# Patient Record
Sex: FEMALE | Race: WHITE | NOT HISPANIC OR LATINO | Employment: FULL TIME | URBAN - METROPOLITAN AREA
[De-identification: names, ages, dates, MRNs, and addresses within clinical notes are randomized per-mention and may not be internally consistent; named-entity substitution may affect disease eponyms.]

---

## 2024-07-19 ENCOUNTER — CLINICAL SUPPORT (OUTPATIENT)
Dept: PRIMARY CARE | Facility: CLINIC | Age: 39
End: 2024-07-19
Payer: COMMERCIAL

## 2024-07-19 ENCOUNTER — APPOINTMENT (OUTPATIENT)
Dept: PRIMARY CARE | Facility: CLINIC | Age: 39
End: 2024-07-19

## 2024-07-19 ENCOUNTER — PHARMACY VISIT (OUTPATIENT)
Dept: PHARMACY | Facility: CLINIC | Age: 39
End: 2024-07-19
Payer: COMMERCIAL

## 2024-07-19 ENCOUNTER — OFFICE VISIT (OUTPATIENT)
Dept: PRIMARY CARE | Facility: CLINIC | Age: 39
End: 2024-07-19
Payer: COMMERCIAL

## 2024-07-19 VITALS
BODY MASS INDEX: 43.63 KG/M2 | WEIGHT: 278 LBS | OXYGEN SATURATION: 98 % | HEART RATE: 97 BPM | SYSTOLIC BLOOD PRESSURE: 122 MMHG | RESPIRATION RATE: 18 BRPM | TEMPERATURE: 96.6 F | HEIGHT: 67 IN | DIASTOLIC BLOOD PRESSURE: 72 MMHG

## 2024-07-19 DIAGNOSIS — R73.9 HYPERGLYCEMIA: Primary | ICD-10-CM

## 2024-07-19 DIAGNOSIS — E11.9 TYPE 2 DIABETES MELLITUS WITHOUT COMPLICATION, WITHOUT LONG-TERM CURRENT USE OF INSULIN (MULTI): Primary | ICD-10-CM

## 2024-07-19 DIAGNOSIS — R73.9 HYPERGLYCEMIA: ICD-10-CM

## 2024-07-19 DIAGNOSIS — Z00.00 ANNUAL PHYSICAL EXAM: ICD-10-CM

## 2024-07-19 PROBLEM — F33.9 DEPRESSION, MAJOR, RECURRENT (CMS-HCC): Status: ACTIVE | Noted: 2022-03-15

## 2024-07-19 PROBLEM — F41.9 ANXIETY: Status: ACTIVE | Noted: 2022-03-22

## 2024-07-19 PROBLEM — K64.8 INTERNAL AND EXTERNAL HEMORRHOIDS WITHOUT COMPLICATION: Status: ACTIVE | Noted: 2017-08-04

## 2024-07-19 PROBLEM — Z90.49 ACQUIRED ABSENCE OF OTHER SPECIFIED PARTS OF DIGESTIVE TRACT: Status: ACTIVE | Noted: 2019-01-03

## 2024-07-19 PROBLEM — F33.1 MDD (MAJOR DEPRESSIVE DISORDER), RECURRENT EPISODE, MODERATE (MULTI): Status: ACTIVE | Noted: 2017-11-13

## 2024-07-19 PROBLEM — F43.10 PTSD (POST-TRAUMATIC STRESS DISORDER): Status: ACTIVE | Noted: 2021-02-05

## 2024-07-19 PROBLEM — K64.4 INTERNAL AND EXTERNAL HEMORRHOIDS WITHOUT COMPLICATION: Status: ACTIVE | Noted: 2017-08-04

## 2024-07-19 PROBLEM — F41.1 GAD (GENERALIZED ANXIETY DISORDER): Status: ACTIVE | Noted: 2017-11-13

## 2024-07-19 PROBLEM — L30.9 DERMATITIS: Status: ACTIVE | Noted: 2018-06-14

## 2024-07-19 LAB — POC HEMOGLOBIN A1C: 6.6 % (ref 4.2–6.5)

## 2024-07-19 PROCEDURE — 3078F DIAST BP <80 MM HG: CPT | Performed by: FAMILY MEDICINE

## 2024-07-19 PROCEDURE — RXMED WILLOW AMBULATORY MEDICATION CHARGE

## 2024-07-19 PROCEDURE — 3074F SYST BP LT 130 MM HG: CPT | Performed by: FAMILY MEDICINE

## 2024-07-19 PROCEDURE — 99385 PREV VISIT NEW AGE 18-39: CPT | Performed by: FAMILY MEDICINE

## 2024-07-19 PROCEDURE — 3008F BODY MASS INDEX DOCD: CPT | Performed by: FAMILY MEDICINE

## 2024-07-19 PROCEDURE — 83036 HEMOGLOBIN GLYCOSYLATED A1C: CPT | Performed by: FAMILY MEDICINE

## 2024-07-19 RX ORDER — TIRZEPATIDE 5 MG/.5ML
5 INJECTION, SOLUTION SUBCUTANEOUS
Qty: 2 ML | Refills: 5 | Status: SHIPPED | OUTPATIENT
Start: 2024-07-19

## 2024-07-19 RX ORDER — TIRZEPATIDE 2.5 MG/.5ML
2.5 INJECTION, SOLUTION SUBCUTANEOUS
Qty: 2 ML | Refills: 0 | Status: SHIPPED | OUTPATIENT
Start: 2024-07-19

## 2024-07-19 ASSESSMENT — PATIENT HEALTH QUESTIONNAIRE - PHQ9
1. LITTLE INTEREST OR PLEASURE IN DOING THINGS: NOT AT ALL
2. FEELING DOWN, DEPRESSED OR HOPELESS: NOT AT ALL
SUM OF ALL RESPONSES TO PHQ9 QUESTIONS 1 AND 2: 0

## 2024-07-19 ASSESSMENT — ENCOUNTER SYMPTOMS
OCCASIONAL FEELINGS OF UNSTEADINESS: 0
LOSS OF SENSATION IN FEET: 0
DEPRESSION: 0

## 2024-07-19 ASSESSMENT — PAIN SCALES - GENERAL: PAINLEVEL: 0-NO PAIN

## 2024-07-19 NOTE — PROGRESS NOTES
"[facility] (VBID)    Hansa Mix is a 39 y.o. female was referred to Clinical Pharmacy Team to complete a comprehensive medication review (CMR) with a pharmacist as part of the [employer] diabetes program.  Pharmacy team may also provide assistance in diabetes management per discussion with referring provider and/or endocrinology.    Referring Provider: Perry Tian, DO  Does patient follow with Endocrinology: No  Endocrinology Provider Name: n/a    Subjective   No Known Allergies    [facility]  [address], [place] [address]  [facility] [address]  Phone: [phone] Fax: [phone]      HPI    New diagnosis of diabetes. Family history present with father.     Objective     There were no vitals taken for this visit.     LAB  No results found for: \"BILITOT\", \"CALCIUM\", \"CO2\", \"CL\", \"CREATININE\", \"GLUCOSE\", \"ALKPHOS\", \"K\", \"PROT\", \"NA\", \"AST\", \"ALT\", \"BUN\", \"ANIONGAP\", \"MG\", \"PHOS\", \"GGT\", \"LDH\", \"ALBUMIN\", \"AMYLASE\", \"LIPASE\", \"GFRF\", \"GFRMALE\"  No results found for: \"TRIG\", \"CHOL\", \"LDLCALC\", \"HDL\"  Lab Results   Component Value Date    HGBA1C 6.6 (A) 2024       No current outpatient medications on file prior to visit.     No current facility-administered medications on file prior to visit.        HISTORICAL PHARMACOTHERAPY (if relevant)  -new diagnosis    CURRENT PHARMACOTHERAPY (if differing from list above)  -n/a    DRUG INTERACTIONS  - n/a    Secondary Prevention:  Statin? No  ACE-I/ARB? No  Aspirin? No    Pertinent PMH Review:  PMH of Pancreatitis: No  PMH of Retinopathy: No  PMH of Urinary Tract Infections: No  PMH of MTC: No    Glucose Readings:  Glucometer/CGM Type: n/a  Patient does not test glucose, new DM diagnosis  Reviewed testing with glucometer  SMBG MEASUREMENTS  Targets reviewed  - Fasting B - 130 mg/dL  - Postprandial (1-2 hours) BG: less than 180 mg/dL  - A1c: less than 7%    HYPOGLYCEMIA  Reviewed " symptoms, treatment and prevention  Advised carrying glucose at all all times     LIFESTYLE  Diet:  CHO foods, portions and meal planning reviewed  Advised protein with meals and starting with protein for mixed macronutrient meals  Reinforced carbs limits    Exercise:  Benefits reviewed, advised working up to 30 minutes    ASSESSMENT/PLAN:   Employee Health Diabetes Program (VBID)  - Patient enrolled in  Employee diabetes program for $0 co-pays on diabetes medications/supplies. Enrollment should be active in 2-4 weeks.  - Requested VBID enrollment date: 07/19/24  - PharmD Management Level:  4  -  Pharmacy fill location: [facility]    New diabetes diagnosis as evidenced by A1c of 6.6%  Start GLP-1/GIP agonist to reduce glucose and for additional weight loss benefit    Patient received the following medication education on Mounjaro:    - Counseled patient on MOA, expectations, side effects, duration of therapy, contraindications, administration, and monitoring parameters.  - Provided detailed dosing and administration counseling to ensure proper technique.   - Reviewed Mounjaro titration schedule, starting with 2.5 mg once weekly to 5mg starting on the 5th week. Patient verbalized understanding  - Counseled patient on the benefits of GLP-1ra glycemic control and weight loss  - Reviewed storage requirements of Mounjaro when not in use, and when to administer the medication if a dose is missed.  - Advised patient that they may experience improved satiety after meals and portion sizes of meals may be reduced as doses of Mounjaro increase.    Reviewed drug interaction with oral contraceptives. Discussed need for back up method such as barrier/condoms to prevent pregnancy during first four weeks of each dose titration. Aware that this medication class is not recommended in pregnancy and to be stopped immediately if pregnancy occurs.       Medication education provided by LOPEZ Ruano, PharmD, BCPS    Assessment/Plan    Problem List Items Addressed This Visit       Type 2 diabetes mellitus without complication, without long-term current use of insulin (Multi) - Primary     Start Mounjaro 2.5mg once weekly x4 weeks  Increase to Mounjaro 5mg once weekly thereafter           Relevant Medications    tirzepatide (Mounjaro) 2.5 mg/0.5 mL pen injector    tirzepatide (Mounjaro) 5 mg/0.5 mL pen injector     Other Visit Diagnoses       Hyperglycemia                   Follow up: 3 months    Continue all meds under the continuation of care with the referring provider and clinical pharmacy team.    Citlali Ruano, [facility]     Verbal consent to manage patient's drug therapy was obtained from [the patient and/or an individual authorized to act on behalf of a patient]. They were informed they may decline to participate or withdraw from participation in pharmacy services at any time.

## 2024-07-19 NOTE — PROGRESS NOTES
"Subjective   Patient ID: Hansa Mix is a 39 y.o. female who presents for Annual Exam and Obesity.    HPI wants to lose weight.  No other complaints    Review of Systems    Objective   /72   Pulse 97   Temp 35.9 °C (96.6 °F)   Resp 18   Ht 1.702 m (5' 7\")   Wt 126 kg (278 lb)   SpO2 98%   BMI 43.54 kg/m²     Physical Exam  Vitals and nursing note reviewed.   Constitutional:       General: She is not in acute distress.  HENT:      Right Ear: Tympanic membrane and ear canal normal.      Left Ear: Tympanic membrane and ear canal normal.      Nose: Nose normal. No rhinorrhea.      Mouth/Throat:      Pharynx: Oropharynx is clear. No oropharyngeal exudate or posterior oropharyngeal erythema.      Comments: Dentition wnl  Eyes:      Extraocular Movements: Extraocular movements intact.      Conjunctiva/sclera: Conjunctivae normal.      Pupils: Pupils are equal, round, and reactive to light.   Neck:      Vascular: No carotid bruit.   Cardiovascular:      Rate and Rhythm: Normal rate and regular rhythm.      Heart sounds: Normal heart sounds. No murmur heard.  Pulmonary:      Breath sounds: Normal breath sounds. No wheezing or rhonchi.   Abdominal:      General: Bowel sounds are normal. There is no distension.      Palpations: Abdomen is soft. There is no mass.      Tenderness: There is no abdominal tenderness. There is no guarding or rebound.      Hernia: No hernia is present.   Musculoskeletal:         General: No swelling or tenderness. Normal range of motion.      Cervical back: Normal range of motion and neck supple.   Lymphadenopathy:      Cervical: No cervical adenopathy.   Skin:     General: Skin is warm.      Findings: No rash.   Neurological:      General: No focal deficit present.      Mental Status: She is alert.         Assessment/Plan   Problem List Items Addressed This Visit    None  Visit Diagnoses         Codes    Hyperglycemia    -  Primary R73.9    Relevant Orders    Follow Up In Clinical " Pharmacy    POCT glycosylated hemoglobin (Hb A1C) manually resulted (Completed)    Annual physical exam     Z00.00

## 2024-07-19 NOTE — PROGRESS NOTES
"Subjective   Patient ID: Hansa Mix is a 39 y.o. female who presents for Annual Exam and Obesity.    HPI she is not fasting     Review of Systems    Objective   /72   Pulse 97   Temp 35.9 °C (96.6 °F)   Resp 18   Ht 1.702 m (5' 7\")   Wt 126 kg (278 lb)   SpO2 98%   BMI 43.54 kg/m²     Physical Exam    Assessment/Plan          "

## 2024-07-22 ENCOUNTER — PATIENT MESSAGE (OUTPATIENT)
Dept: PRIMARY CARE | Facility: CLINIC | Age: 39
End: 2024-07-22
Payer: COMMERCIAL

## 2024-07-22 ENCOUNTER — LAB (OUTPATIENT)
Dept: LAB | Facility: LAB | Age: 39
End: 2024-07-22
Payer: COMMERCIAL

## 2024-07-22 DIAGNOSIS — R73.9 HYPERGLYCEMIA: ICD-10-CM

## 2024-07-22 DIAGNOSIS — Z00.00 ANNUAL PHYSICAL EXAM: ICD-10-CM

## 2024-07-22 LAB
BASOPHILS # BLD AUTO: 0.12 X10*3/UL (ref 0–0.1)
BASOPHILS NFR BLD AUTO: 1.2 %
EOSINOPHIL # BLD AUTO: 0.29 X10*3/UL (ref 0–0.7)
EOSINOPHIL NFR BLD AUTO: 2.9 %
ERYTHROCYTE [DISTWIDTH] IN BLOOD BY AUTOMATED COUNT: 13 % (ref 11.5–14.5)
HCT VFR BLD AUTO: 43 % (ref 36–46)
HGB BLD-MCNC: 13.9 G/DL (ref 12–16)
IMM GRANULOCYTES # BLD AUTO: 0.03 X10*3/UL (ref 0–0.7)
IMM GRANULOCYTES NFR BLD AUTO: 0.3 % (ref 0–0.9)
LYMPHOCYTES # BLD AUTO: 3.95 X10*3/UL (ref 1.2–4.8)
LYMPHOCYTES NFR BLD AUTO: 40 %
MCH RBC QN AUTO: 29.6 PG (ref 26–34)
MCHC RBC AUTO-ENTMCNC: 32.3 G/DL (ref 32–36)
MCV RBC AUTO: 92 FL (ref 80–100)
MONOCYTES # BLD AUTO: 0.59 X10*3/UL (ref 0.1–1)
MONOCYTES NFR BLD AUTO: 6 %
NEUTROPHILS # BLD AUTO: 4.9 X10*3/UL (ref 1.2–7.7)
NEUTROPHILS NFR BLD AUTO: 49.6 %
NRBC BLD-RTO: 0 /100 WBCS (ref 0–0)
PLATELET # BLD AUTO: 364 X10*3/UL (ref 150–450)
RBC # BLD AUTO: 4.7 X10*6/UL (ref 4–5.2)
WBC # BLD AUTO: 9.9 X10*3/UL (ref 4.4–11.3)

## 2024-07-22 PROCEDURE — 80053 COMPREHEN METABOLIC PANEL: CPT

## 2024-07-22 PROCEDURE — 84443 ASSAY THYROID STIM HORMONE: CPT

## 2024-07-22 PROCEDURE — 36415 COLL VENOUS BLD VENIPUNCTURE: CPT

## 2024-07-22 PROCEDURE — 80061 LIPID PANEL: CPT

## 2024-07-22 PROCEDURE — 85025 COMPLETE CBC W/AUTO DIFF WBC: CPT

## 2024-07-23 LAB
ALBUMIN SERPL BCP-MCNC: 4.1 G/DL (ref 3.4–5)
ALP SERPL-CCNC: 63 U/L (ref 33–110)
ALT SERPL W P-5'-P-CCNC: 29 U/L (ref 7–45)
ANION GAP SERPL CALC-SCNC: 11 MMOL/L (ref 10–20)
AST SERPL W P-5'-P-CCNC: 25 U/L (ref 9–39)
BILIRUB SERPL-MCNC: 0.5 MG/DL (ref 0–1.2)
BUN SERPL-MCNC: 9 MG/DL (ref 6–23)
CALCIUM SERPL-MCNC: 9.2 MG/DL (ref 8.6–10.3)
CHLORIDE SERPL-SCNC: 104 MMOL/L (ref 98–107)
CHOLEST SERPL-MCNC: 194 MG/DL (ref 0–199)
CHOLESTEROL/HDL RATIO: 6.2
CO2 SERPL-SCNC: 26 MMOL/L (ref 21–32)
CREAT SERPL-MCNC: 0.75 MG/DL (ref 0.5–1.05)
EGFRCR SERPLBLD CKD-EPI 2021: >90 ML/MIN/1.73M*2
GLUCOSE SERPL-MCNC: 94 MG/DL (ref 74–99)
HDLC SERPL-MCNC: 31.5 MG/DL
LDLC SERPL CALC-MCNC: 134 MG/DL
NON HDL CHOLESTEROL: 163 MG/DL (ref 0–149)
POTASSIUM SERPL-SCNC: 4.2 MMOL/L (ref 3.5–5.3)
PROT SERPL-MCNC: 7.3 G/DL (ref 6.4–8.2)
SODIUM SERPL-SCNC: 137 MMOL/L (ref 136–145)
TRIGL SERPL-MCNC: 145 MG/DL (ref 0–149)
TSH SERPL-ACNC: 3.02 MIU/L (ref 0.44–3.98)
VLDL: 29 MG/DL (ref 0–40)

## 2024-08-07 PROCEDURE — RXMED WILLOW AMBULATORY MEDICATION CHARGE

## 2024-08-13 ENCOUNTER — PHARMACY VISIT (OUTPATIENT)
Dept: PHARMACY | Facility: CLINIC | Age: 39
End: 2024-08-13
Payer: COMMERCIAL

## 2024-08-22 ASSESSMENT — ENCOUNTER SYMPTOMS
NERVOUS/ANXIOUS: 0
POLYDIPSIA: 0
BLACKOUTS: 0
WEIGHT LOSS: 0
TREMORS: 0
WEAKNESS: 0
FATIGUE: 0
DIZZINESS: 0
SPEECH DIFFICULTY: 0
VISUAL CHANGE: 0
SWEATS: 0
HUNGER: 0
BLURRED VISION: 0
POLYPHAGIA: 0
CONFUSION: 0
HEADACHES: 0
SEIZURES: 0

## 2024-08-26 ENCOUNTER — OFFICE VISIT (OUTPATIENT)
Dept: PRIMARY CARE | Facility: CLINIC | Age: 39
End: 2024-08-26
Payer: COMMERCIAL

## 2024-08-26 VITALS
HEART RATE: 98 BPM | OXYGEN SATURATION: 96 % | RESPIRATION RATE: 18 BRPM | SYSTOLIC BLOOD PRESSURE: 122 MMHG | WEIGHT: 266.8 LBS | TEMPERATURE: 96 F | DIASTOLIC BLOOD PRESSURE: 62 MMHG | HEIGHT: 67 IN | BODY MASS INDEX: 41.88 KG/M2

## 2024-08-26 DIAGNOSIS — J06.9 VIRAL UPPER RESPIRATORY TRACT INFECTION: ICD-10-CM

## 2024-08-26 DIAGNOSIS — E11.9 TYPE 2 DIABETES MELLITUS WITHOUT COMPLICATION, WITHOUT LONG-TERM CURRENT USE OF INSULIN (MULTI): Primary | ICD-10-CM

## 2024-08-26 PROCEDURE — 3008F BODY MASS INDEX DOCD: CPT | Performed by: FAMILY MEDICINE

## 2024-08-26 PROCEDURE — 3078F DIAST BP <80 MM HG: CPT | Performed by: FAMILY MEDICINE

## 2024-08-26 PROCEDURE — 99213 OFFICE O/P EST LOW 20 MIN: CPT | Performed by: FAMILY MEDICINE

## 2024-08-26 PROCEDURE — 3074F SYST BP LT 130 MM HG: CPT | Performed by: FAMILY MEDICINE

## 2024-08-26 PROCEDURE — 3050F LDL-C >= 130 MG/DL: CPT | Performed by: FAMILY MEDICINE

## 2024-08-26 ASSESSMENT — ENCOUNTER SYMPTOMS
WEAKNESS: 0
HEADACHES: 0
TREMORS: 0
POLYPHAGIA: 0
POLYDIPSIA: 0
SPEECH DIFFICULTY: 0
DIZZINESS: 0
DEPRESSION: 0
FATIGUE: 0
BLURRED VISION: 0
NERVOUS/ANXIOUS: 0
WEIGHT LOSS: 0
CONFUSION: 0
OCCASIONAL FEELINGS OF UNSTEADINESS: 0
SEIZURES: 0
VISUAL CHANGE: 0
BLACKOUTS: 0
SWEATS: 0
HUNGER: 0
LOSS OF SENSATION IN FEET: 0

## 2024-08-26 ASSESSMENT — PAIN SCALES - GENERAL: PAINLEVEL: 0-NO PAIN

## 2024-08-26 NOTE — PROGRESS NOTES
Answers submitted by the patient for this visit:  Diabetes Questionnaire (Submitted on 8/22/2024)  Chief Complaint: Diabetes problem  Diabetes type: type 2  MedicAlert ID: No  Disease duration: 1 Months  blurred vision: No  chest pain: No  fatigue: No  foot paresthesias: No  foot ulcerations: No  polydipsia: No  polyphagia: No  polyuria: No  visual change: No  weakness: No  weight loss: No  confusion: No  speech difficulty: No  dizziness: No  nervous/anxious: No  headaches: No  hunger: No  mood changes: No  pallor: No  seizures: No  tremors: No  sleepiness: No  sweats: No  blackouts: No  hospitalization: No  nocturnal hypoglycemia: No  required assistance: No  required glucagon: No  CVA: No  heart disease: No  impotence: No  nephropathy: No  peripheral neuropathy: No  PVD: No  retinopathy: No  CAD risks: dyslipidemia, family history, obesity, sedentary lifestyle, stress, tobacco exposure  Current treatments: oral agent (monotherapy)  Treatment compliance: all of the time  Dietitian visit: No  Eye exam current: No  Sees podiatrist: No

## 2024-08-26 NOTE — PROGRESS NOTES
"Subjective   Patient ID: Hansa Mix is a 39 y.o. female who presents for Med Refill and Sinus Problem.    Diabetes  She has type 2 diabetes mellitus. No MedicAlert identification noted. The initial diagnosis of diabetes was made 1 months ago. Pertinent negatives for hypoglycemia include no confusion, dizziness, headaches, hunger, mood changes, nervousness/anxiousness, pallor, seizures, sleepiness, speech difficulty, sweats or tremors. Pertinent negatives for diabetes include no blurred vision, no chest pain, no fatigue, no foot paresthesias, no foot ulcerations, no polydipsia, no polyphagia, no polyuria, no visual change, no weakness and no weight loss. Pertinent negatives for hypoglycemia complications include no blackouts, no hospitalization, no nocturnal hypoglycemia, no required assistance and no required glucagon injection. Pertinent negatives for diabetic complications include no CVA, heart disease, impotence, nephropathy, peripheral neuropathy, PVD or retinopathy. Risk factors for coronary artery disease include dyslipidemia, family history, obesity, sedentary lifestyle, stress and tobacco exposure. Current diabetic treatment includes oral agent (monotherapy). She is compliant with treatment all of the time. She has not had a previous visit with a dietitian. She does not see a podiatrist.Eye exam is not current.        Review of Systems   Constitutional:  Negative for fatigue and weight loss.   Eyes:  Negative for blurred vision.   Cardiovascular:  Negative for chest pain.   Endocrine: Negative for polydipsia, polyphagia and polyuria.   Genitourinary:  Negative for impotence.   Skin:  Negative for pallor.   Neurological:  Negative for dizziness, tremors, seizures, speech difficulty, weakness and headaches.   Psychiatric/Behavioral:  Negative for confusion. The patient is not nervous/anxious.        Objective   /62   Pulse 98   Temp 35.6 °C (96 °F)   Resp 18   Ht 1.702 m (5' 7\")   Wt 121 kg " (266 lb 12.8 oz)   SpO2 96%   BMI 41.79 kg/m²     Physical Exam  Constitutional:       General: She is not in acute distress.     Appearance: Normal appearance.   Cardiovascular:      Rate and Rhythm: Normal rate and regular rhythm.      Heart sounds: No murmur heard.  Pulmonary:      Breath sounds: Normal breath sounds. No wheezing.   Neurological:      Mental Status: She is alert.         Assessment/Plan   Problem List Items Addressed This Visit             ICD-10-CM    Type 2 diabetes mellitus without complication, without long-term current use of insulin (Multi) - Primary E11.9     Other Visit Diagnoses         Codes    Viral upper respiratory tract infection     J06.9          Supportive tx

## 2024-09-04 PROCEDURE — RXMED WILLOW AMBULATORY MEDICATION CHARGE

## 2024-09-09 ENCOUNTER — PHARMACY VISIT (OUTPATIENT)
Dept: PHARMACY | Facility: CLINIC | Age: 39
End: 2024-09-09
Payer: COMMERCIAL

## 2024-09-17 DIAGNOSIS — E11.9 TYPE 2 DIABETES MELLITUS WITHOUT COMPLICATION, WITHOUT LONG-TERM CURRENT USE OF INSULIN (MULTI): Primary | ICD-10-CM

## 2024-09-17 RX ORDER — TIRZEPATIDE 7.5 MG/.5ML
7.5 INJECTION, SOLUTION SUBCUTANEOUS
Qty: 2 ML | Refills: 3 | Status: SHIPPED | OUTPATIENT
Start: 2024-09-17

## 2024-09-23 PROCEDURE — RXMED WILLOW AMBULATORY MEDICATION CHARGE

## 2024-09-24 ENCOUNTER — PHARMACY VISIT (OUTPATIENT)
Dept: PHARMACY | Facility: CLINIC | Age: 39
End: 2024-09-24
Payer: COMMERCIAL

## 2024-10-07 ENCOUNTER — PHARMACY VISIT (OUTPATIENT)
Dept: PHARMACY | Facility: CLINIC | Age: 39
End: 2024-10-07
Payer: COMMERCIAL

## 2024-10-07 DIAGNOSIS — R11.0 NAUSEA: Primary | ICD-10-CM

## 2024-10-07 PROCEDURE — RXMED WILLOW AMBULATORY MEDICATION CHARGE

## 2024-10-07 RX ORDER — ONDANSETRON 4 MG/1
4 TABLET, FILM COATED ORAL EVERY 8 HOURS PRN
Qty: 20 TABLET | Refills: 1 | Status: SHIPPED | OUTPATIENT
Start: 2024-10-07

## 2024-10-17 DIAGNOSIS — E11.9 TYPE 2 DIABETES MELLITUS WITHOUT COMPLICATION, WITHOUT LONG-TERM CURRENT USE OF INSULIN (MULTI): Primary | ICD-10-CM

## 2024-10-17 PROCEDURE — RXMED WILLOW AMBULATORY MEDICATION CHARGE

## 2024-10-17 RX ORDER — TIRZEPATIDE 10 MG/.5ML
10 INJECTION, SOLUTION SUBCUTANEOUS
Qty: 2 ML | Refills: 3 | Status: SHIPPED | OUTPATIENT
Start: 2024-10-20

## 2024-10-21 ENCOUNTER — PHARMACY VISIT (OUTPATIENT)
Dept: PHARMACY | Facility: CLINIC | Age: 39
End: 2024-10-21
Payer: COMMERCIAL

## 2024-11-11 ENCOUNTER — PHARMACY VISIT (OUTPATIENT)
Dept: PHARMACY | Facility: CLINIC | Age: 39
End: 2024-11-11
Payer: COMMERCIAL

## 2024-11-11 DIAGNOSIS — E11.9 TYPE 2 DIABETES MELLITUS WITHOUT COMPLICATION, WITHOUT LONG-TERM CURRENT USE OF INSULIN (MULTI): Primary | ICD-10-CM

## 2024-11-11 PROCEDURE — RXMED WILLOW AMBULATORY MEDICATION CHARGE

## 2024-11-11 RX ORDER — TIRZEPATIDE 12.5 MG/.5ML
12.5 INJECTION, SOLUTION SUBCUTANEOUS WEEKLY
Qty: 2 ML | Refills: 3 | Status: SHIPPED | OUTPATIENT
Start: 2024-11-11

## 2024-12-02 ENCOUNTER — OFFICE VISIT (OUTPATIENT)
Dept: PRIMARY CARE | Facility: CLINIC | Age: 39
End: 2024-12-02
Payer: COMMERCIAL

## 2024-12-02 ENCOUNTER — PHARMACY VISIT (OUTPATIENT)
Dept: PHARMACY | Facility: CLINIC | Age: 39
End: 2024-12-02
Payer: COMMERCIAL

## 2024-12-02 VITALS
HEIGHT: 67 IN | RESPIRATION RATE: 18 BRPM | SYSTOLIC BLOOD PRESSURE: 128 MMHG | BODY MASS INDEX: 37.54 KG/M2 | HEART RATE: 93 BPM | WEIGHT: 239.2 LBS | OXYGEN SATURATION: 98 % | DIASTOLIC BLOOD PRESSURE: 86 MMHG | TEMPERATURE: 97.3 F

## 2024-12-02 DIAGNOSIS — K64.4 EXTERNAL HEMORRHOID: ICD-10-CM

## 2024-12-02 DIAGNOSIS — R03.0 ELEVATED BLOOD PRESSURE READING: ICD-10-CM

## 2024-12-02 DIAGNOSIS — K62.89 RECTAL PAIN: Primary | ICD-10-CM

## 2024-12-02 PROCEDURE — RXMED WILLOW AMBULATORY MEDICATION CHARGE

## 2024-12-02 PROCEDURE — 3050F LDL-C >= 130 MG/DL: CPT | Performed by: NURSE PRACTITIONER

## 2024-12-02 PROCEDURE — 3008F BODY MASS INDEX DOCD: CPT | Performed by: NURSE PRACTITIONER

## 2024-12-02 PROCEDURE — 3074F SYST BP LT 130 MM HG: CPT | Performed by: NURSE PRACTITIONER

## 2024-12-02 PROCEDURE — 99213 OFFICE O/P EST LOW 20 MIN: CPT | Performed by: NURSE PRACTITIONER

## 2024-12-02 PROCEDURE — 3079F DIAST BP 80-89 MM HG: CPT | Performed by: NURSE PRACTITIONER

## 2024-12-02 RX ORDER — HYDROCORTISONE ACETATE, PRAMOXINE HCL 2.5; 1 G/100G; G/100G
CREAM TOPICAL 3 TIMES DAILY
Qty: 57 G | Refills: 1 | Status: SHIPPED | OUTPATIENT
Start: 2024-12-02 | End: 2025-12-02

## 2024-12-02 ASSESSMENT — ENCOUNTER SYMPTOMS: RECTAL PAIN: 1

## 2024-12-02 ASSESSMENT — PAIN SCALES - GENERAL: PAINLEVEL_OUTOF10: 9

## 2024-12-02 NOTE — PROGRESS NOTES
"Subjective   Patient ID: Hansa Mxi is a 39 y.o. female who presents for Rectal Pain (PT c/o RECTAL PAIN X 1 MONTH. PT HAS TRIED MULTIPLE OTC HEMORRHOID MEDICATIONS. PT REPORTS SOME BLEEDING. PAIN IS WORSE WITH SITTING. /PT HAD AN ANAL FISSURE 10 YEARS AGO, STATES SYMPTOMS ARE SIMILAR. ).    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        Occasional  blood  RECTAL PAIN       Objective   /86   Pulse 93   Temp 36.3 °C (97.3 °F)   Resp 18   Ht 1.702 m (5' 7\")   Wt 109 kg (239 lb 3.2 oz)   LMP 11/29/2024 (Exact Date)   SpO2 98%   BMI 37.46 kg/m²     Physical Exam  Constitutional:       Appearance: Normal appearance.   Cardiovascular:      Rate and Rhythm: Normal rate and regular rhythm.   Abdominal:      Comments: Rectal exam  hemmroids very painful when checking for blood  ? Possible fissure   Neurological:      Mental Status: She is alert and oriented to person, place, and time.   Psychiatric:         Behavior: Behavior normal.         Assessment/Plan   Problem List Items Addressed This Visit    None  Visit Diagnoses         Codes    Rectal pain    -  Primary K62.89    Relevant Medications    hydrocortisone-pramoxine cream    Other Relevant Orders    Referral to General Surgery    Referral to General Surgery    Acute hemorrhoid     K64.9        Discussed referral hemorrhoid unless  internal not thrombosed or appear to be a\flaring up . Continue sitz bath   call if unable to get into surgeon       "

## 2024-12-03 ENCOUNTER — PHARMACY VISIT (OUTPATIENT)
Dept: PHARMACY | Facility: CLINIC | Age: 39
End: 2024-12-03
Payer: COMMERCIAL

## 2024-12-04 ENCOUNTER — OFFICE VISIT (OUTPATIENT)
Facility: CLINIC | Age: 39
End: 2024-12-04
Payer: COMMERCIAL

## 2024-12-04 ENCOUNTER — PHARMACY VISIT (OUTPATIENT)
Dept: PHARMACY | Facility: CLINIC | Age: 39
End: 2024-12-04
Payer: COMMERCIAL

## 2024-12-04 VITALS
DIASTOLIC BLOOD PRESSURE: 78 MMHG | HEIGHT: 67 IN | BODY MASS INDEX: 37.51 KG/M2 | OXYGEN SATURATION: 97 % | HEART RATE: 108 BPM | SYSTOLIC BLOOD PRESSURE: 114 MMHG | WEIGHT: 239 LBS | RESPIRATION RATE: 15 BRPM

## 2024-12-04 DIAGNOSIS — K60.2 ANAL FISSURE, UNSPECIFIED: ICD-10-CM

## 2024-12-04 DIAGNOSIS — K62.89 RECTAL PAIN: ICD-10-CM

## 2024-12-04 PROCEDURE — 99203 OFFICE O/P NEW LOW 30 MIN: CPT | Performed by: SURGERY

## 2024-12-04 PROCEDURE — RXMED WILLOW AMBULATORY MEDICATION CHARGE

## 2024-12-04 PROCEDURE — 3078F DIAST BP <80 MM HG: CPT | Performed by: SURGERY

## 2024-12-04 PROCEDURE — 3050F LDL-C >= 130 MG/DL: CPT | Performed by: SURGERY

## 2024-12-04 PROCEDURE — 3074F SYST BP LT 130 MM HG: CPT | Performed by: SURGERY

## 2024-12-04 PROCEDURE — 3008F BODY MASS INDEX DOCD: CPT | Performed by: SURGERY

## 2024-12-04 RX ORDER — OXYCODONE AND ACETAMINOPHEN 5; 325 MG/1; MG/1
1 TABLET ORAL EVERY 6 HOURS PRN
Qty: 5 TABLET | Refills: 0 | Status: SHIPPED | OUTPATIENT
Start: 2024-12-04 | End: 2024-12-11

## 2024-12-04 ASSESSMENT — PAIN SCALES - GENERAL: PAINLEVEL_OUTOF10: 9

## 2024-12-04 NOTE — PROGRESS NOTES
Subjective   Patient ID: Hansa Mix is a 39 y.o. female who presents for Rectal Pain and New Patient Visit (Patient has had rectal pain for last month.Patient states it has gotten worse over last 4 days. Patient states has had bleeding on and off for a month.).  HPI  This is a pleasant patient who is in the office today because of most likely a Ament anal fissure.  The patient states that 7 years ago she had a similar type episode a lot of pain some bleeding used a lot of creams and sitz bath's and stool softeners and when it still did not heal she saw colorectal surgeon at that time at the [facility] and had a lateral internal sphincterotomy performed.  The patient had been well since but then about a month ago she started having the same issues all over again the same pain a lot of bleeding and just very very uncomfortable.  The patient is quite tearful in the office.  Review of Systems 10 point review is otherwise negative    Social history she does smoke she does not drink alcohol.    Objective   Physical Exam on physical exam of that region the patient does have some external hemorrhoidal tissue there is an external hemorrhoid that looks as though it might have been thrombosed at 1 time because it seems to be decompressed now there is no bleeding.  I was not able to do a digital exam of course posteriorly she has some rale looking tissue that would be suggestive of a fissure once again.  It is very wide.  There is no inflammatory exudate on it.  The surrounding perianal skin is soft does not seem to be any abscess or other concerns.    Assessment/Plan posterior anal fissure.  I recommended that the patient continue with the cream since it is best that she has been doing I have nothing else that I can offer because she has already had a lateral internal sphincterotomy to my knowledge that cannot necessarily be repeated although I do not know the definitive answer to that question.  I would recommend  that she see colorectal surgery for further management.  There is the possibility that an exam under anesthesia might be more revealing or possibly there is another issue however the history seems very consistent with a fissure and I think that her best bet is to see colorectal.           Lisa Wright MD 12/04/24 2:29 PM

## 2024-12-04 NOTE — LETTER
December 4, 2024     Patient: Hansa Mix   YOB: 1985   Date of Visit: 12/4/2024       To Whom It May Concern:    Hansa Mix was seen in my clinic on 12/4/2024 at 1:00 pm. Please excuse Hansa for her absence from work from 12/4/2024 until 12/16/2024.   If you have any questions or concerns, please don't hesitate to call.         Sincerely,         Lisa Wright MD        CC: No Recipients

## 2024-12-05 ENCOUNTER — APPOINTMENT (OUTPATIENT)
Dept: SURGERY | Facility: CLINIC | Age: 39
End: 2024-12-05
Payer: COMMERCIAL

## 2024-12-06 ENCOUNTER — PHARMACY VISIT (OUTPATIENT)
Dept: PHARMACY | Facility: CLINIC | Age: 39
End: 2024-12-06
Payer: COMMERCIAL

## 2024-12-06 ENCOUNTER — OFFICE VISIT (OUTPATIENT)
Dept: SURGERY | Facility: CLINIC | Age: 39
End: 2024-12-06
Payer: COMMERCIAL

## 2024-12-06 ENCOUNTER — TELEPHONE (OUTPATIENT)
Dept: SURGERY | Facility: CLINIC | Age: 39
End: 2024-12-06
Payer: COMMERCIAL

## 2024-12-06 VITALS
HEART RATE: 84 BPM | SYSTOLIC BLOOD PRESSURE: 118 MMHG | DIASTOLIC BLOOD PRESSURE: 62 MMHG | OXYGEN SATURATION: 97 % | TEMPERATURE: 98 F

## 2024-12-06 DIAGNOSIS — K60.2 ANAL FISSURE, UNSPECIFIED: ICD-10-CM

## 2024-12-06 DIAGNOSIS — K62.89 RECTAL PAIN: Primary | ICD-10-CM

## 2024-12-06 PROCEDURE — 99204 OFFICE O/P NEW MOD 45 MIN: CPT | Performed by: STUDENT IN AN ORGANIZED HEALTH CARE EDUCATION/TRAINING PROGRAM

## 2024-12-06 PROCEDURE — 3050F LDL-C >= 130 MG/DL: CPT | Performed by: STUDENT IN AN ORGANIZED HEALTH CARE EDUCATION/TRAINING PROGRAM

## 2024-12-06 PROCEDURE — RXMED WILLOW AMBULATORY MEDICATION CHARGE

## 2024-12-06 PROCEDURE — 99214 OFFICE O/P EST MOD 30 MIN: CPT | Performed by: STUDENT IN AN ORGANIZED HEALTH CARE EDUCATION/TRAINING PROGRAM

## 2024-12-06 PROCEDURE — 3074F SYST BP LT 130 MM HG: CPT | Performed by: STUDENT IN AN ORGANIZED HEALTH CARE EDUCATION/TRAINING PROGRAM

## 2024-12-06 PROCEDURE — 3078F DIAST BP <80 MM HG: CPT | Performed by: STUDENT IN AN ORGANIZED HEALTH CARE EDUCATION/TRAINING PROGRAM

## 2024-12-06 RX ORDER — TIZANIDINE 2 MG/1
4 TABLET ORAL EVERY 6 HOURS PRN
Qty: 30 TABLET | Refills: 0 | Status: SHIPPED | OUTPATIENT
Start: 2024-12-06

## 2024-12-06 ASSESSMENT — PAIN SCALES - GENERAL: PAINLEVEL_OUTOF10: 9

## 2024-12-06 NOTE — Clinical Note
Please schedule this patient for Injection Therapeutic Agent Anus/Rectum on Friday 12/20/2024 with Dr. Blakely

## 2024-12-06 NOTE — PATIENT INSTRUCTIONS
Thank you for scheduling surgery with Dr. Blakely   Below you will find your Surgery Itinerary to include dates, times, and locations for appointments involved with your procedure.      A representative from the hospital will contact you directly to schedule any Pre Admission Testing appointments needed.    On the day before the scheduled surgery, please call the Same Day Surgery department between 2-4 pm for a time of arrival for the day of procedure.  [facility] [phone]    Nothing to eat or drink after midnight the night before surgery    Surgery with Dr. Blakely at [facility] - [address] [place] [address]  On: Friday 12/20/2024    Postoperative appointment is scheduled at [facility] office: [address] [facility] [address] [facility] [address], [place], [address]              On: Friday 1/17/2025 at 1:45pm     *Please note, you may receive a call from our financial counselors if you have a financial liability greater than $250.         [facility]  Pre - Operative Instructions     Your time of arrival for surgery is available the day before surgery. *If the surgery is on a Monday, or the Tuesday following a Monday Holiday, please call Same Day Surgery the Friday before your surgery date.*    DO NOT EAT OR DRINK ANYTHING AFTER MIDNIGHT THE NIGHT BEFORE SURGERY. This includes any beverages (coffee, water, soda, etc.), hard candy, gum or mints. If this is not followed, surgery may be canceled. Please avoid eating a large meal the evening before surgery. Please do not DRINK ALCOHOL or SMOKE FOR 24 HOURS before surgery.     Insulin Instructions - Please do not take any short acting Insulin (Regular or NPH). Do not take any oral diabetic medication on the day of surgery. Long acting Insulins may be taken (Lantus). We will check your blood sugar and administer at the hospital the day of surgery. Patient who have Insulin pumps are to make NO adjustments.     Prescription Medications -  You are encouraged to take prescription medications including heart, blood pressure, anti-seizure, anxiety, breathing medications (including inhalers) with exception to diabetic medications prior to arriving at the hospital the day of surgery. You may take prescribed pain medications as needed. Please remember the dose and time taken so we may inform your Anesthesiologist.     Please bring the name, dosage, and frequency of your medications if you did not provide these on the day of Pre Admission Testing. You may bring the actual bottles if this would be easier for you.     Please bring your prescribed inhalers with you the morning of surgery.     Patients on Anti-platelet and Anticoagulant agents, please read the following:  ASA, NSAIDS stop 5 days prior to surgery  Coumadin stop 5 days prior to surgery unless bridging therapy is needed (metal valve replacement, cardiac stent placement) - if so please speak to your Cardiologist or prescribing physician.   Other anti-platelet and anticoagulant agents:  Plavix (Clopidogrel) stop 5 days prior to surgery  Brilinta (Ticagrelor) stop 5 days prior to surgery   Effient (Prasugrel) stop 7 days prior to surgery   Lovenox (Enoxaparin) stop 24 hours prior to surgery  Arixtra (Fondaparinux) stop 5 days prior to surgery  Xarelto (Rivaroxaban) stop 3 days prior to surgery  Pradaxa (Dabigatran) stop 5 days prior to surgery  Eliquis (Apixaban) stop 3 days prior to surgery   Savaysa (Endoxaban) stop days prior to surgery     Please stop all herbal medications 2 weeks prior to surgery     C-PAP Devices - If you have a C-PAP device at home, bring it with you on our day of surgery if your surgery requires you to stay overnight.     Please bring a copy of any Advanced Directives the day of surgery if you did not provide it at Pre Admission Testing. These documents are living lanza and durable power of  for healthcare.     Please notify your physician/surgeon if you develop a  cold, sore throat, fever, flu symptoms, COVID symptoms or any changes in your physical conditions.     A shower or bath is preferred the evening before or the morning of surgery.     Remove jewelry before admission to the hospital. It is no longer permitted to tape rings. We ask that you leave all valuables at home. Any items of value will be given to a family member or locked up by security.   If you have a body piercing g that you cannot remove, it is recommended that you have it removed professionally and have a plastic spacer inserted. There is a risk for surgical burns with jewelry left in place.     Remove or wear minimal makeup the day of surgery. You will be asked to remove glasses and contact lenses prior to surgery. Please bring a glass case and/or contact lens case with you. These items are not provided.     Dentures and partials are usually removed prior to surgery. A denture cup will be provided for you.       You may be asked to remove nail polish. Acrylic nails are now acceptable.     Wear loose comfortable clothing that you will be able to fit over bandages when you leave the hospitals (as appropriate for your surgery).    Patients that are under the age of 18 years must have a parent or guardian present the day of surgery.     Family members of significant others may stay with you on the Same Day Surgery unit. While you are in surgery, they may wait for you in the family waiting area. The physician will speak to the waiting family, if permitted by the patient, after surgery.     Changes or delays in the surgery schedule may occur due to emergencies. The hospital will notify you if this occurs. We apologize for any inconveniences this may present.     You must have a responsible  available to drive you home after surgery. You will not be permitted to drive yourself home after surgery if you have received any anesthesia or sedation during your procedure.     Please visit our website at  [email or web address] for more information regarding [facility] services.      For questions about your Pre Admission Testing (PAT)  [facility] [phone]  [facility] [phone]    Thank you for choosing [facility]!

## 2024-12-06 NOTE — LETTER
December 6, 2024     Patient: Hansa Mix   YOB: 1985   Date of Visit: 12/6/2024       To Whom It May Concern:    It is my medical opinion that Hansa Mix should remain out of work until 12/23/2024 .    If you have any questions or concerns, please don't hesitate to call.         Sincerely,        Jessica Blakely MD

## 2024-12-06 NOTE — PROGRESS NOTES
History Of Present Illness  Hansa Mix is a 39 y.o. female presenting for evaluation of anal pain and occasional bleeding.  She has a history of an anal fissure in 2017 and underwent lateral internal sphincterotomy.  She has not had any issues since then,  however a month ago developed pain with occasional bleeding with wiping.  She saw her primary care ordered a hydrocortisone cream and she reports it has not helped with the pain.     Past Medical History  She has a past medical history of Depression, Diabetes mellitus (Multi) (July 2024), Diverticulitis of colon, Fissure, anal (2017, October 2024), and Rectal bleeding.    Surgical History  She has a past surgical history that includes Appendectomy.     Social History  She reports that she quit smoking about 7 months ago. Her smoking use included cigarettes. She has never used smokeless tobacco. She reports current alcohol use of about 1.0 standard drink of alcohol per week. She reports current drug use. Drug: Marijuana.    Family History  Family History   Problem Relation Name Age of Onset    Depression Mother chio     Depression Father remedios     Diabetes Father remedios     Hearing loss Father remedios     Heart disease Father remedios     Hypertension Father remedios     Skin cancer Maternal Grandmother      Breast cancer Maternal Grandmother      Colon cancer Maternal Grandfather Jluis         Allergies  Patient has no known allergies.    Review of Systems   Constitutional:  Negative for chills, fever and unexpected weight change.   HENT:  Negative for sneezing, sore throat, trouble swallowing and voice change.    Respiratory:  Negative for chest tightness and shortness of breath.    Cardiovascular:  Negative for chest pain and palpitations.   Gastrointestinal:  Positive for anal bleeding and rectal pain. Negative for abdominal pain, blood in stool, diarrhea, nausea and vomiting.   Endocrine: Negative for cold intolerance and heat intolerance.   Genitourinary:  Negative for  decreased urine volume, dysuria and hematuria.   Musculoskeletal:  Negative for arthralgias and gait problem.   Skin:  Negative for rash and wound.   Neurological:  Negative for facial asymmetry, speech difficulty and headaches.   Hematological:  Negative for adenopathy. Does not bruise/bleed easily.   Psychiatric/Behavioral:  Negative for self-injury and suicidal ideas.         Physical Exam  Vitals and nursing note reviewed.   Constitutional:       Appearance: Normal appearance.   HENT:      Head: Normocephalic and atraumatic.      Mouth/Throat:      Mouth: Mucous membranes are moist.      Pharynx: Oropharynx is clear.   Eyes:      Extraocular Movements: Extraocular movements intact.      Pupils: Pupils are equal, round, and reactive to light.   Cardiovascular:      Rate and Rhythm: Normal rate and regular rhythm.      Pulses: Normal pulses.   Pulmonary:      Effort: Pulmonary effort is normal.      Breath sounds: Normal breath sounds.   Abdominal:      General: There is no distension.      Palpations: Abdomen is soft.      Tenderness: There is no abdominal tenderness.   Genitourinary:     Comments: Internal exam deferred secondary to pain, posterior midline anal fissure seen and increased pain with spreading of the gluteus  Musculoskeletal:      Cervical back: Normal range of motion and neck supple.   Skin:     General: Skin is warm and dry.   Neurological:      General: No focal deficit present.      Mental Status: She is alert and oriented to person, place, and time.   Psychiatric:         Mood and Affect: Mood normal.         Behavior: Behavior normal.          Last Recorded Vitals  Blood pressure 118/62, pulse 84, temperature 36.7 °C (98 °F), temperature source Oral, last menstrual period 11/29/2024, SpO2 97%.    Relevant Results  Reviewed operative note from previous sphincterotomy     Assessment/Plan   Problem List Items Addressed This Visit    None  Visit Diagnoses         Codes    Rectal pain    -   Primary K62.89    Anal fissure, unspecified     K60.2    Relevant Medications    tiZANidine (Zanaflex) 2 mg tablet    Other Relevant Orders    Case Request Operating Room: Injection Therapeutic Agent Anus/Rectum (Completed)          Recurrent anal fissure    We discussed the etiology of anal fissures and the different treatment options starting with conservative/medical management and ranging to surgery.  Her pain is severe and preventing her from doing her job.  We discussed nitroglycerin ointment but given her need for sphincterotomy before, this may not completely heal.  We also discussed surgical intervention in the form of Botox versus repeat sphincterotomy.  Discussed the risks of sphincterotomy including incontinence.  I recommended initial Botox injection with repeat sphincterotomy if the fissure does not heal.  I have called her in nitroglycerin ointment to the [facility] pharmacy for her to use in the meantime to try and get some relief and healing of the fissure.  All questions answered.  Upon doing no off      Jessica Blakely MD

## 2024-12-06 NOTE — TELEPHONE ENCOUNTER
Patient is scheduled for surgery with Dr. Blakely at [facility] on Friday 12/20/2024.  Pre-admission testing is scheduled at [facility] on 12/13/2024.

## 2024-12-06 NOTE — TELEPHONE ENCOUNTER
----- Message from Nurse Kala VILLALTA sent at 12/6/2024  2:59 PM EST -----  Please schedule this patient for Injection Therapeutic Agent Anus/Rectum on Friday 12/20/2024 with Dr. Blakely

## 2024-12-11 ASSESSMENT — ENCOUNTER SYMPTOMS
SPEECH DIFFICULTY: 0
TROUBLE SWALLOWING: 0
ARTHRALGIAS: 0
SHORTNESS OF BREATH: 0
VOMITING: 0
PALPITATIONS: 0
ADENOPATHY: 0
HEADACHES: 0
VOICE CHANGE: 0
FACIAL ASYMMETRY: 0
UNEXPECTED WEIGHT CHANGE: 0
SORE THROAT: 0
FEVER: 0
HEMATURIA: 0
ANAL BLEEDING: 1
BLOOD IN STOOL: 0
BRUISES/BLEEDS EASILY: 0
WOUND: 0
NAUSEA: 0
CHILLS: 0
DIARRHEA: 0
RECTAL PAIN: 1
CHEST TIGHTNESS: 0
ABDOMINAL PAIN: 0
DYSURIA: 0

## 2024-12-13 ENCOUNTER — PRE-ADMISSION TESTING (OUTPATIENT)
Dept: PREADMISSION TESTING | Facility: HOSPITAL | Age: 39
End: 2024-12-13
Payer: COMMERCIAL

## 2024-12-13 VITALS
DIASTOLIC BLOOD PRESSURE: 90 MMHG | OXYGEN SATURATION: 99 % | BODY MASS INDEX: 37.79 KG/M2 | RESPIRATION RATE: 18 BRPM | HEART RATE: 97 BPM | WEIGHT: 240.74 LBS | SYSTOLIC BLOOD PRESSURE: 133 MMHG | TEMPERATURE: 96.8 F | HEIGHT: 67 IN

## 2024-12-13 DIAGNOSIS — Z01.818 PRE-OP EXAMINATION: Primary | ICD-10-CM

## 2024-12-13 PROCEDURE — 99203 OFFICE O/P NEW LOW 30 MIN: CPT

## 2024-12-13 PROCEDURE — 93005 ELECTROCARDIOGRAM TRACING: CPT

## 2024-12-13 ASSESSMENT — DUKE ACTIVITY SCORE INDEX (DASI)
CAN YOU CLIMB A FLIGHT OF STAIRS OR WALK UP A HILL: YES
CAN YOU RUN A SHORT DISTANCE: YES
CAN YOU HAVE SEXUAL RELATIONS: YES
CAN YOU DO YARD WORK LIKE RAKING LEAVES, WEEDING OR PUSHING A MOWER: YES
CAN YOU TAKE CARE OF YOURSELF (EAT, DRESS, BATHE, OR USE TOILET): YES
CAN YOU PARTICIPATE IN STRENOUS SPORTS LIKE SWIMMING, SINGLES TENNIS, FOOTBALL, BASKETBALL, OR SKIING: YES
CAN YOU WALK INDOORS, SUCH AS AROUND YOUR HOUSE: YES
CAN YOU DO LIGHT WORK AROUND THE HOUSE LIKE DUSTING OR WASHING DISHES: YES
CAN YOU DO HEAVY WORK AROUND THE HOUSE LIKE SCRUBBING FLOORS OR LIFTING AND MOVING HEAVY FURNITURE: YES
CAN YOU PARTICIPATE IN MODERATE RECREATIONAL ACTIVITIES LIKE GOLF, BOWLING, DANCING, DOUBLES TENNIS OR THROWING A BASEBALL OR FOOTBALL: YES
TOTAL_SCORE: 58.2
DASI METS SCORE: 9.9
CAN YOU WALK A BLOCK OR TWO ON LEVEL GROUND: YES
CAN YOU DO MODERATE WORK AROUND THE HOUSE LIKE VACUUMING, SWEEPING FLOORS OR CARRYING GROCERIES: YES

## 2024-12-13 ASSESSMENT — PAIN DESCRIPTION - DESCRIPTORS: DESCRIPTORS: SHARP;STABBING;BURNING

## 2024-12-13 ASSESSMENT — PAIN - FUNCTIONAL ASSESSMENT: PAIN_FUNCTIONAL_ASSESSMENT: 0-10

## 2024-12-13 ASSESSMENT — ENCOUNTER SYMPTOMS
ENDOCRINE NEGATIVE: 1
CONSTITUTIONAL NEGATIVE: 1
EYES NEGATIVE: 1
RESPIRATORY NEGATIVE: 1
NEUROLOGICAL NEGATIVE: 1
ALLERGIC/IMMUNOLOGIC NEGATIVE: 1
MUSCULOSKELETAL NEGATIVE: 1
HEMATOLOGIC/LYMPHATIC NEGATIVE: 1
CARDIOVASCULAR NEGATIVE: 1
RECTAL PAIN: 1
BLOOD IN STOOL: 1
PSYCHIATRIC NEGATIVE: 1

## 2024-12-13 ASSESSMENT — PAIN SCALES - GENERAL: PAINLEVEL_OUTOF10: 7

## 2024-12-13 NOTE — CPM/PAT H&P
CPM/PAT Evaluation       Name: Hansa Mix (Hansa Mix)  /Age: 1985/39 y.o.     In-Person       Chief Complaint: Anal fissure    HPI: Hansa Mix is a 39 year old female with complaints of anal pain. She has a history of an anal fissure. She states she had a sphincterotomy 7 years ago. She states everything was normal until this past October. She started having anal pain, blood in the stool, and occasional abdominal pain. She states she is taking Tizanidine at home for pain. She was seen by general surgery who discovered a posterior anal fissure. She was referred to colorectal surgeon and is scheduled for an anus/rectum Botox injection. She denies recent fever, chills, nausea, vomiting, chest pain, sob, dizziness, and palpitations.     Past Medical History:   Diagnosis Date    Depression     Diabetes mellitus (Multi) 2024    Diverticulitis of colon     Fissure, anal     Rectal bleeding        Past Surgical History:   Procedure Laterality Date    APPENDECTOMY       Social History     Tobacco Use    Smoking status: Every Day     Current packs/day: 0.00     Types: Cigarettes     Last attempt to quit: 2024     Years since quittin.6    Smokeless tobacco: Never   Substance Use Topics    Alcohol use: Yes     Alcohol/week: 1.0 standard drink of alcohol     Types: 1 Glasses of wine per week     Comment: socially     Social History     Substance and Sexual Activity   Drug Use Yes    Types: Marijuana    Comment: GUMMIES         Family History   Problem Relation Name Age of Onset    Depression Mother chio     Depression Father remedios     Diabetes Father remedios     Hearing loss Father remedios     Heart disease Father remedios     Hypertension Father remedios     Skin cancer Maternal Grandmother      Breast cancer Maternal Grandmother      Colon cancer Maternal Grandfather Jluis        No Known Allergies  Current Outpatient Medications   Medication Sig Dispense Refill    docusate sodium  (Colace) 50 mg capsule Take 100 capsules (5,000 mg) by mouth 2 times a day.      nitroglycerin in white petrolatum ointment Apply topically to affected area 2 times a day. 30 g 1    ondansetron (Zofran) 4 mg tablet Take 1 tablet (4 mg) by mouth every 8 hours if needed for nausea or vomiting. 20 tablet 1    tirzepatide (Mounjaro) 12.5 mg/0.5 mL pen injector Inject 12.5 mg under the skin 1 (one) time per week. 2 mL 3    tiZANidine (Zanaflex) 2 mg tablet Take 2 tablets (4 mg) by mouth every 6 hours if needed (pain). 30 tablet 0    hydrocortisone-pramoxine cream Apply topically 3 times a day. (Patient not taking: Reported on 12/13/2024) 57 g 1    tirzepatide (Mounjaro) 10 mg/0.5 mL pen injector Inject 10 mg under the skin 1 (one) time per week. (Patient not taking: Reported on 12/13/2024) 2 mL 3    tirzepatide (Mounjaro) 5 mg/0.5 mL pen injector Inject 5 mg under the skin every 7 days. (Patient not taking: Reported on 12/13/2024) 2 mL 5    tirzepatide (Mounjaro) 7.5 mg/0.5 mL pen injector Inject 7.5 mg under the skin 1 (one) time per week. (Patient not taking: Reported on 12/13/2024) 2 mL 3     No current facility-administered medications for this visit.       Review of Systems   Constitutional: Negative.    HENT: Negative.     Eyes: Negative.    Respiratory: Negative.     Cardiovascular: Negative.    Gastrointestinal:  Positive for blood in stool and rectal pain.   Endocrine: Negative.    Genitourinary: Negative.    Musculoskeletal: Negative.    Skin: Negative.    Allergic/Immunologic: Negative.    Neurological: Negative.    Hematological: Negative.    Psychiatric/Behavioral: Negative.                Physical Exam  Vitals reviewed.   Constitutional:       Appearance: Normal appearance.   HENT:      Head: Normocephalic and atraumatic.      Nose: Nose normal.      Mouth/Throat:      Mouth: Mucous membranes are moist.      Pharynx: Oropharynx is clear.   Eyes:      Extraocular Movements: Extraocular movements intact.       "Conjunctiva/sclera: Conjunctivae normal.   Cardiovascular:      Rate and Rhythm: Normal rate and regular rhythm.      Pulses: Normal pulses.      Heart sounds: Normal heart sounds.   Pulmonary:      Effort: Pulmonary effort is normal.      Breath sounds: Normal breath sounds.   Abdominal:      General: Bowel sounds are normal.      Palpations: Abdomen is soft.   Genitourinary:     Comments: Assessment deferred to physician  Musculoskeletal:         General: Normal range of motion.      Cervical back: Normal range of motion and neck supple.   Skin:     General: Skin is warm and dry.   Neurological:      General: No focal deficit present.      Mental Status: She is alert and oriented to person, place, and time.   Psychiatric:         Mood and Affect: Mood normal.         Behavior: Behavior normal.         Thought Content: Thought content normal.         Judgment: Judgment normal.          PAT AIRWAY:   Airway:     Mallampati::  II    TM distance::  >3 FB    Neck ROM::  Full  normal                Visit Vitals  /90   Pulse 97   Temp 36 °C (96.8 °F) (Temporal)   Resp 18   Ht 1.702 m (5' 7\")   Wt 109 kg (240 lb 11.9 oz)   LMP 11/29/2024 (Exact Date)   SpO2 99%   BMI 37.71 kg/m²   OB Status Having periods   Smoking Status Every Day   BSA 2.27 m²     ASA:II  VLAD: 2.8%  RCRI: 0.4%    DASI Risk Score      Flowsheet Row Pre-Admission Testing from 12/13/2024 in [facility] Questionnaire Series Submission from 12/6/2024 in [facility] with Generic Provider Alana   Can you take care of yourself (eat, dress, bathe, or use toilet)?  2.75 filed at 12/13/2024 1525 2.75  filed at 12/06/2024 1611   Can you walk indoors, such as around your house? 1.75 filed at 12/13/2024 1525 1.75  filed at 12/06/2024 1611   Can you walk a block or two on level ground?  2.75 filed at 12/13/2024 1525 2.75  filed at 12/06/2024 1611   Can you climb a flight of stairs or walk up a hill? 5.5 filed at 12/13/2024 1525 5.5  filed at " 12/06/2024 1611   Can you run a short distance? 8 filed at 12/13/2024 1525 8  filed at 12/06/2024 1611   Can you do light work around the house like dusting or washing dishes? 2.7 filed at 12/13/2024 1525 2.7  filed at 12/06/2024 1611   Can you do moderate work around the house like vacuuming, sweeping floors or carrying groceries? 3.5 filed at 12/13/2024 1525 3.5  filed at 12/06/2024 1611   Can you do heavy work around the house like scrubbing floors or lifting and moving heavy furniture?  8 filed at 12/13/2024 1525 8  filed at 12/06/2024 1611   Can you do yard work like raking leaves, weeding or pushing a mower? 4.5 filed at 12/13/2024 1525 4.5  filed at 12/06/2024 1611   Can you have sexual relations? 5.25 filed at 12/13/2024 1525 5.25  filed at 12/06/2024 1611   Can you participate in moderate recreational activities like golf, bowling, dancing, doubles tennis or throwing a baseball or football? 6 filed at 12/13/2024 1525 6  filed at 12/06/2024 1611   Can you participate in strenous sports like swimming, singles tennis, football, basketball, or skiing? 7.5 filed at 12/13/2024 1525 7.5  filed at 12/06/2024 1611   DASI SCORE 58.2 filed at 12/13/2024 1525 58.2  filed at 12/06/2024 1611   METS Score (Will be calculated only when all the questions are answered) 9.9 filed at 12/13/2024 1525 9.9  filed at 12/06/2024 1611          Caprini DVT Assessment    No data to display       Modified Frailty Index    No data to display       CHADS2 Stroke Risk  Current as of 2 minutes ago        N/A 3 to 100%: High Risk   2 to < 3%: Medium Risk   0 to < 2%: Low Risk     Last Change: N/A          This score determines the patient's risk of having a stroke if the patient has atrial fibrillation.        This score is not applicable to this patient. Components are not calculated.          Revised Cardiac Risk Index      Flowsheet Row Pre-Admission Testing from 12/13/2024 in [facility]   High-Risk Surgery  (Intraperitoneal, Intrathoracic,Suprainguinal vascular) 0 filed at 12/13/2024 1625   History of ischemic heart disease (History of MI, History of positive exercuse test, Current chest paint considered due to myocardial ischemia, Use of nitrate therapy, ECG with pathological Q Waves) 0 filed at 12/13/2024 1625   History of congestive heart failure (pulmonary edemia, bilateral rales or S3 gallop, Paroxysmal nocturnal dyspnea, CXR showing pulmonary vascular redistribution) 0 filed at 12/13/2024 1625   History of cerebrovascular disease (Prior TIA or stroke) 0 filed at 12/13/2024 1625   Pre-operative insulin treatment 0 filed at 12/13/2024 1625   Pre-operative creatinine>2 mg/dl 0 filed at 12/13/2024 1625   Revised Cardiac Risk Calculator 0 filed at 12/13/2024 1625          Apfel Simplified Score    No data to display       Risk Analysis Index Results This Encounter    No data found in the last 10 encounters.       Stop Bang Score      Flowsheet Row Pre-Admission Testing from 12/13/2024 in [facility] Questionnaire Series Submission from 12/6/2024 in [facility] with Generic Provider Alana   Do you snore loudly? 1 filed at 12/13/2024 1524 0  filed at 12/06/2024 1611   Do you often feel tired or fatigued after your sleep? 0 filed at 12/13/2024 1524 0  filed at 12/06/2024 1611   Has anyone ever observed you stop breathing in your sleep? 0 filed at 12/13/2024 1524 0  filed at 12/06/2024 1611   Do you have or are you being treated for high blood pressure? 0 filed at 12/13/2024 1524 0  filed at 12/06/2024 1611   Recent BMI (Calculated) 37.7 filed at 12/13/2024 1524 37.4  filed at 12/06/2024 1611   Is BMI greater than 35 kg/m2? 1=Yes filed at 12/13/2024 1524 1=Yes  filed at 12/06/2024 1611   Age older than 50 years old? 0=No filed at 12/13/2024 1524 0=No  filed at 12/06/2024 1611   Is your neck circumference greater than 17 inches (Male) or 16 inches (Female)? 0 filed at 12/13/2024 1524 --   Gender - Male  0=No filed at 12/13/2024 1524 0=No  filed at 12/06/2024 1611   STOP-BANG Total Score 2 filed at 12/13/2024 1524 --          Prodigy: High Risk  Total Score: 0          ARISCAT Score for Postoperative Pulmonary Complications    No data to display       [place] Perioperative Risk for Myocardial Infarction or Cardiac Arrest (SHIRA)    No data to display         Assessment and Plan:     Anal fissure : Injection Therapeutic Agent Anus/Rectum   Diabetes type 2  Depression  Diverticulitis   BMI: 37.71    EKG done in [facility] shows NSR with sinus arrhythmia, low voltage QRS, cannot rule out anterior infarct, age undetermined: Similar to EKG done 1/3/24    Pretty Quijano, APRN-CNP

## 2024-12-13 NOTE — PREPROCEDURE INSTRUCTIONS
Medication List            Accurate as of December 13, 2024  3:27 PM. Always use your most recent med list.                docusate sodium 50 mg capsule  Commonly known as: Colace  Medication Adjustments for Surgery: Take/Use as prescribed     hydrocortisone-pramoxine cream  Apply topically 3 times a day.  Notes to patient: NOT USING     * Mounjaro 5 mg/0.5 mL pen injector  Generic drug: tirzepatide  Inject 5 mg under the skin every 7 days.  Notes to patient: NOT USING     * Mounjaro 7.5 mg/0.5 mL pen injector  Generic drug: tirzepatide  Inject 7.5 mg under the skin 1 (one) time per week.  Notes to patient: NOT USING     * Mounjaro 10 mg/0.5 mL pen injector  Generic drug: tirzepatide  Inject 10 mg under the skin 1 (one) time per week.  Notes to patient: NOT USING     * Mounjaro 12.5 mg/0.5 mL pen injector  Generic drug: tirzepatide  Inject 12.5 mg under the skin 1 (one) time per week.  Additional Medication Adjustments for Surgery: Take last dose 7 days before surgery     nitroglycerin in white petrolatum ointment  Apply topically to affected area 2 times a day.  Medication Adjustments for Surgery: Take/Use as prescribed     ondansetron 4 mg tablet  Commonly known as: Zofran  Take 1 tablet (4 mg) by mouth every 8 hours if needed for nausea or vomiting.  Medication Adjustments for Surgery: Take/Use as prescribed     tiZANidine 2 mg tablet  Commonly known as: Zanaflex  Take 2 tablets (4 mg) by mouth every 6 hours if needed (pain).  Medication Adjustments for Surgery: Take/Use as prescribed           * This list has 4 medication(s) that are the same as other medications prescribed for you. Read the directions carefully, and ask your doctor or other care provider to review them with you.                                  NPO Instructions:    Do not eat any food after midnight the night before your surgery/procedure.  You may have clear liquids until TWO hours before surgery/procedure. This includes water, black  tea/coffee, (no milk or cream) apple juice and electrolyte drinks (Gatorade).  You may chew gum up to TWO hours before your surgery/procedure.    Additional Instructions:     Day of Surgery:  Review your medication instructions, take indicated medications  If you have diabetes, please check your fasting blood sugar upon awakening.  If fasting blood sugar is <80 mg/dl, drink 100 ml of apple juice, time limit of 2 hours before  You may have clear liquids until TWO hours before surgery/procedure.  This includes water, black tea/coffee, (no milk or cream) apple juice and electrolyte drinks (Gatorade)  You may chew gum up to TWO hours before your surgery/procedure  Wear  comfortable loose fitting clothing  Do not use moisturizers, creams, lotions or perfume  All jewelry and valuables should be left at homePAT DISCHARGE INSTRUCTIONS    Please call the Same Day Surgery (SDS) Department of the hospital where your procedure will be performed after 2:00 PM the day before your surgery. If you are scheduled on a Monday, or a Tuesday following a Monday holiday, you will need to call on the last business day prior to your surgery.    [facility]  [address] [place] [address] [phone]  [facility]  [address] [place] [address], [address] [phone]  [facility]  [address] [place] [address].  [place] [id number] [phone]    Please let your surgeon know if:      You develop any open sores, shingles, burning or painful urination as these may increase your risk of an infection.   You no longer wish to have the surgery.   Any other personal circumstances change that may lead to the need to cancel or defer this surgery-such as being sick or getting admitted to any hospital within one week of your planned procedure.    Your contact details change, such as a change of address or phone  number.    Starting now:     Please DO NOT drink alcohol or smoke for 24 hours before surgery. It is well known that quitting smoking can make a huge difference to your health and recovery from surgery. The longer you abstain from smoking, the better your chances of a healthy recovery. If you need help with quitting, call 7-560-QUIT-NOW to be connected to a trained counselor who will discuss the best methods to help you quit.     Before your surgery:    Please stop all supplements 7 days prior to surgery. Or as directed by your surgeon.   Please stop taking NSAID pain medicine such as Advil and Motrin 7 days before surgery.    If you develop any fever, cough, cold, rashes, cuts, scratches, scrapes, urinary symptoms or infection anywhere on your body (including teeth and gums) prior to surgery, please call your surgeon’s office as soon as possible. This may require treatment to reduce the chance of cancellation on the day of surgery.    The day before your surgery:   DIET- Please follow the diet instructions at the top of your packet.   Get a good night’s rest.  Use the special soap for bathing if you have been instructed to use one.    Scheduled surgery times may change and you will be notified if this occurs - please check your personal voicemail for any updates.     On the morning of surgery:   Wear comfortable, loose fitting clothes which open in the front. Please do not wear moisturizers, creams, lotions, makeup or perfume.    Please bring with you to surgery:   Photo ID and insurance card   Current list of medicines and allergies   Pacemaker/ Defibrillator/Heart stent cards   CPAP machine and mask    Slings/ splints/ crutches   A copy of your complete advanced directive/DHPOA.    Please do NOT bring with you to surgery:   All jewelry and valuables should be left at home.   Prosthetic devices such as contact lenses, hearing aids, dentures, eyelash extensions, hairpins and body piercings must be removed prior to  going in to the surgical suite.    After outpatient surgery:   A responsible adult MUST accompany you at the time of discharge and stay with you for 24 hours after your surgery. You may NOT drive yourself home after surgery.    Do not drive, operate machinery, make critical decisions or do activities that require co-ordination or balance until after a night’s sleep.   Do not drink alcoholic beverages for 24 hours.   Instructions for resuming your medications will be provided by your surgeon.    CALL YOUR DOCTOR AFTER SURGERY IF YOU HAVE:     Chills and/or a fever of 101° F or higher.    Redness, swelling, pus or drainage from your surgical wound or a bad smell from the wound.    Lightheadedness, fainting or confusion.    Persistent vomiting (throwing up) and are not able to eat or drink for 12 hours.    Three or more loose, watery bowel movements in 24 hours (diarrhea).   Difficulty or pain while urinating( after non-urological surgery)    Pain and swelling in your legs, especially if it is only on one side.    Difficulty breathing or are breathing faster than normal.    Any new concerning symptoms.

## 2024-12-13 NOTE — H&P (VIEW-ONLY)
CPM/PAT Evaluation       Name: Hansa Mix (Hansa Mix)  /Age: 1985/39 y.o.     In-Person       Chief Complaint: Anal fissure    HPI: Hansa Mix is a 39 year old female with complaints of anal pain. She has a history of an anal fissure. She states she had a sphincterotomy 7 years ago. She states everything was normal until this past October. She started having anal pain, blood in the stool, and occasional abdominal pain. She states she is taking Tizanidine at home for pain. She was seen by general surgery who discovered a posterior anal fissure. She was referred to colorectal surgeon and is scheduled for an anus/rectum Botox injection. She denies recent fever, chills, nausea, vomiting, chest pain, sob, dizziness, and palpitations.     Past Medical History:   Diagnosis Date    Depression     Diabetes mellitus (Multi) 2024    Diverticulitis of colon     Fissure, anal     Rectal bleeding        Past Surgical History:   Procedure Laterality Date    APPENDECTOMY       Social History     Tobacco Use    Smoking status: Every Day     Current packs/day: 0.00     Types: Cigarettes     Last attempt to quit: 2024     Years since quittin.6    Smokeless tobacco: Never   Substance Use Topics    Alcohol use: Yes     Alcohol/week: 1.0 standard drink of alcohol     Types: 1 Glasses of wine per week     Comment: socially     Social History     Substance and Sexual Activity   Drug Use Yes    Types: Marijuana    Comment: GUMMIES         Family History   Problem Relation Name Age of Onset    Depression Mother hcio     Depression Father remedios     Diabetes Father remedios     Hearing loss Father remedios     Heart disease Father remedios     Hypertension Father remedios     Skin cancer Maternal Grandmother      Breast cancer Maternal Grandmother      Colon cancer Maternal Grandfather Jluis        No Known Allergies  Current Outpatient Medications   Medication Sig Dispense Refill    docusate sodium  (Colace) 50 mg capsule Take 100 capsules (5,000 mg) by mouth 2 times a day.      nitroglycerin in white petrolatum ointment Apply topically to affected area 2 times a day. 30 g 1    ondansetron (Zofran) 4 mg tablet Take 1 tablet (4 mg) by mouth every 8 hours if needed for nausea or vomiting. 20 tablet 1    tirzepatide (Mounjaro) 12.5 mg/0.5 mL pen injector Inject 12.5 mg under the skin 1 (one) time per week. 2 mL 3    tiZANidine (Zanaflex) 2 mg tablet Take 2 tablets (4 mg) by mouth every 6 hours if needed (pain). 30 tablet 0    hydrocortisone-pramoxine cream Apply topically 3 times a day. (Patient not taking: Reported on 12/13/2024) 57 g 1    tirzepatide (Mounjaro) 10 mg/0.5 mL pen injector Inject 10 mg under the skin 1 (one) time per week. (Patient not taking: Reported on 12/13/2024) 2 mL 3    tirzepatide (Mounjaro) 5 mg/0.5 mL pen injector Inject 5 mg under the skin every 7 days. (Patient not taking: Reported on 12/13/2024) 2 mL 5    tirzepatide (Mounjaro) 7.5 mg/0.5 mL pen injector Inject 7.5 mg under the skin 1 (one) time per week. (Patient not taking: Reported on 12/13/2024) 2 mL 3     No current facility-administered medications for this visit.       Review of Systems   Constitutional: Negative.    HENT: Negative.     Eyes: Negative.    Respiratory: Negative.     Cardiovascular: Negative.    Gastrointestinal:  Positive for blood in stool and rectal pain.   Endocrine: Negative.    Genitourinary: Negative.    Musculoskeletal: Negative.    Skin: Negative.    Allergic/Immunologic: Negative.    Neurological: Negative.    Hematological: Negative.    Psychiatric/Behavioral: Negative.                Physical Exam  Vitals reviewed.   Constitutional:       Appearance: Normal appearance.   HENT:      Head: Normocephalic and atraumatic.      Nose: Nose normal.      Mouth/Throat:      Mouth: Mucous membranes are moist.      Pharynx: Oropharynx is clear.   Eyes:      Extraocular Movements: Extraocular movements intact.       "Conjunctiva/sclera: Conjunctivae normal.   Cardiovascular:      Rate and Rhythm: Normal rate and regular rhythm.      Pulses: Normal pulses.      Heart sounds: Normal heart sounds.   Pulmonary:      Effort: Pulmonary effort is normal.      Breath sounds: Normal breath sounds.   Abdominal:      General: Bowel sounds are normal.      Palpations: Abdomen is soft.   Genitourinary:     Comments: Assessment deferred to physician  Musculoskeletal:         General: Normal range of motion.      Cervical back: Normal range of motion and neck supple.   Skin:     General: Skin is warm and dry.   Neurological:      General: No focal deficit present.      Mental Status: She is alert and oriented to person, place, and time.   Psychiatric:         Mood and Affect: Mood normal.         Behavior: Behavior normal.         Thought Content: Thought content normal.         Judgment: Judgment normal.          PAT AIRWAY:   Airway:     Mallampati::  II    TM distance::  >3 FB    Neck ROM::  Full  normal                Visit Vitals  /90   Pulse 97   Temp 36 °C (96.8 °F) (Temporal)   Resp 18   Ht 1.702 m (5' 7\")   Wt 109 kg (240 lb 11.9 oz)   LMP 11/29/2024 (Exact Date)   SpO2 99%   BMI 37.71 kg/m²   OB Status Having periods   Smoking Status Every Day   BSA 2.27 m²     ASA:II  VLAD: 2.8%  RCRI: 0.4%    DASI Risk Score      Flowsheet Row Pre-Admission Testing from 12/13/2024 in [facility] Questionnaire Series Submission from 12/6/2024 in [facility] with Generic Provider Alana   Can you take care of yourself (eat, dress, bathe, or use toilet)?  2.75 filed at 12/13/2024 1525 2.75  filed at 12/06/2024 1611   Can you walk indoors, such as around your house? 1.75 filed at 12/13/2024 1525 1.75  filed at 12/06/2024 1611   Can you walk a block or two on level ground?  2.75 filed at 12/13/2024 1525 2.75  filed at 12/06/2024 1611   Can you climb a flight of stairs or walk up a hill? 5.5 filed at 12/13/2024 1525 5.5  filed at " 12/06/2024 1611   Can you run a short distance? 8 filed at 12/13/2024 1525 8  filed at 12/06/2024 1611   Can you do light work around the house like dusting or washing dishes? 2.7 filed at 12/13/2024 1525 2.7  filed at 12/06/2024 1611   Can you do moderate work around the house like vacuuming, sweeping floors or carrying groceries? 3.5 filed at 12/13/2024 1525 3.5  filed at 12/06/2024 1611   Can you do heavy work around the house like scrubbing floors or lifting and moving heavy furniture?  8 filed at 12/13/2024 1525 8  filed at 12/06/2024 1611   Can you do yard work like raking leaves, weeding or pushing a mower? 4.5 filed at 12/13/2024 1525 4.5  filed at 12/06/2024 1611   Can you have sexual relations? 5.25 filed at 12/13/2024 1525 5.25  filed at 12/06/2024 1611   Can you participate in moderate recreational activities like golf, bowling, dancing, doubles tennis or throwing a baseball or football? 6 filed at 12/13/2024 1525 6  filed at 12/06/2024 1611   Can you participate in strenous sports like swimming, singles tennis, football, basketball, or skiing? 7.5 filed at 12/13/2024 1525 7.5  filed at 12/06/2024 1611   DASI SCORE 58.2 filed at 12/13/2024 1525 58.2  filed at 12/06/2024 1611   METS Score (Will be calculated only when all the questions are answered) 9.9 filed at 12/13/2024 1525 9.9  filed at 12/06/2024 1611          Caprini DVT Assessment    No data to display       Modified Frailty Index    No data to display       CHADS2 Stroke Risk  Current as of 2 minutes ago        N/A 3 to 100%: High Risk   2 to < 3%: Medium Risk   0 to < 2%: Low Risk     Last Change: N/A          This score determines the patient's risk of having a stroke if the patient has atrial fibrillation.        This score is not applicable to this patient. Components are not calculated.          Revised Cardiac Risk Index      Flowsheet Row Pre-Admission Testing from 12/13/2024 in [facility]   High-Risk Surgery  (Intraperitoneal, Intrathoracic,Suprainguinal vascular) 0 filed at 12/13/2024 1625   History of ischemic heart disease (History of MI, History of positive exercuse test, Current chest paint considered due to myocardial ischemia, Use of nitrate therapy, ECG with pathological Q Waves) 0 filed at 12/13/2024 1625   History of congestive heart failure (pulmonary edemia, bilateral rales or S3 gallop, Paroxysmal nocturnal dyspnea, CXR showing pulmonary vascular redistribution) 0 filed at 12/13/2024 1625   History of cerebrovascular disease (Prior TIA or stroke) 0 filed at 12/13/2024 1625   Pre-operative insulin treatment 0 filed at 12/13/2024 1625   Pre-operative creatinine>2 mg/dl 0 filed at 12/13/2024 1625   Revised Cardiac Risk Calculator 0 filed at 12/13/2024 1625          Apfel Simplified Score    No data to display       Risk Analysis Index Results This Encounter    No data found in the last 10 encounters.       Stop Bang Score      Flowsheet Row Pre-Admission Testing from 12/13/2024 in [facility] Questionnaire Series Submission from 12/6/2024 in [facility] with Generic Provider Alana   Do you snore loudly? 1 filed at 12/13/2024 1524 0  filed at 12/06/2024 1611   Do you often feel tired or fatigued after your sleep? 0 filed at 12/13/2024 1524 0  filed at 12/06/2024 1611   Has anyone ever observed you stop breathing in your sleep? 0 filed at 12/13/2024 1524 0  filed at 12/06/2024 1611   Do you have or are you being treated for high blood pressure? 0 filed at 12/13/2024 1524 0  filed at 12/06/2024 1611   Recent BMI (Calculated) 37.7 filed at 12/13/2024 1524 37.4  filed at 12/06/2024 1611   Is BMI greater than 35 kg/m2? 1=Yes filed at 12/13/2024 1524 1=Yes  filed at 12/06/2024 1611   Age older than 50 years old? 0=No filed at 12/13/2024 1524 0=No  filed at 12/06/2024 1611   Is your neck circumference greater than 17 inches (Male) or 16 inches (Female)? 0 filed at 12/13/2024 1524 --   Gender - Male  0=No filed at 12/13/2024 1524 0=No  filed at 12/06/2024 1611   STOP-BANG Total Score 2 filed at 12/13/2024 1524 --          Prodigy: High Risk  Total Score: 0          ARISCAT Score for Postoperative Pulmonary Complications    No data to display       [place] Perioperative Risk for Myocardial Infarction or Cardiac Arrest (SHIRA)    No data to display         Assessment and Plan:     Anal fissure : Injection Therapeutic Agent Anus/Rectum   Diabetes type 2  Depression  Diverticulitis   BMI: 37.71    EKG done in [facility] shows NSR with sinus arrhythmia, low voltage QRS, cannot rule out anterior infarct, age undetermined: Similar to EKG done 1/3/24    Pretty Quijano, APRN-CNP

## 2024-12-19 ENCOUNTER — E-VISIT (OUTPATIENT)
Dept: SURGERY | Facility: CLINIC | Age: 39
End: 2024-12-19
Payer: COMMERCIAL

## 2024-12-20 ENCOUNTER — HOSPITAL ENCOUNTER (OUTPATIENT)
Facility: HOSPITAL | Age: 39
Setting detail: OUTPATIENT SURGERY
Discharge: HOME | End: 2024-12-20
Attending: STUDENT IN AN ORGANIZED HEALTH CARE EDUCATION/TRAINING PROGRAM | Admitting: STUDENT IN AN ORGANIZED HEALTH CARE EDUCATION/TRAINING PROGRAM
Payer: COMMERCIAL

## 2024-12-20 ENCOUNTER — ANESTHESIA EVENT (OUTPATIENT)
Dept: OPERATING ROOM | Facility: HOSPITAL | Age: 39
End: 2024-12-20
Payer: COMMERCIAL

## 2024-12-20 ENCOUNTER — ANESTHESIA (OUTPATIENT)
Dept: OPERATING ROOM | Facility: HOSPITAL | Age: 39
End: 2024-12-20
Payer: COMMERCIAL

## 2024-12-20 VITALS
DIASTOLIC BLOOD PRESSURE: 74 MMHG | OXYGEN SATURATION: 100 % | TEMPERATURE: 97 F | HEART RATE: 79 BPM | SYSTOLIC BLOOD PRESSURE: 92 MMHG | RESPIRATION RATE: 16 BRPM

## 2024-12-20 DIAGNOSIS — K62.89 RECTAL PAIN: Primary | ICD-10-CM

## 2024-12-20 DIAGNOSIS — K60.2 ANAL FISSURE, UNSPECIFIED: ICD-10-CM

## 2024-12-20 LAB
GLUCOSE BLD MANUAL STRIP-MCNC: 89 MG/DL (ref 74–99)
PREGNANCY TEST URINE, POC: NEGATIVE

## 2024-12-20 PROCEDURE — 7100000009 HC PHASE TWO TIME - INITIAL BASE CHARGE: Performed by: STUDENT IN AN ORGANIZED HEALTH CARE EDUCATION/TRAINING PROGRAM

## 2024-12-20 PROCEDURE — 96372 THER/PROPH/DIAG INJ SC/IM: CPT | Performed by: STUDENT IN AN ORGANIZED HEALTH CARE EDUCATION/TRAINING PROGRAM

## 2024-12-20 PROCEDURE — 3600000003 HC OR TIME - INITIAL BASE CHARGE - PROCEDURE LEVEL THREE: Performed by: STUDENT IN AN ORGANIZED HEALTH CARE EDUCATION/TRAINING PROGRAM

## 2024-12-20 PROCEDURE — 3600000008 HC OR TIME - EACH INCREMENTAL 1 MINUTE - PROCEDURE LEVEL THREE: Performed by: STUDENT IN AN ORGANIZED HEALTH CARE EDUCATION/TRAINING PROGRAM

## 2024-12-20 PROCEDURE — 2500000004 HC RX 250 GENERAL PHARMACY W/ HCPCS (ALT 636 FOR OP/ED): Performed by: STUDENT IN AN ORGANIZED HEALTH CARE EDUCATION/TRAINING PROGRAM

## 2024-12-20 PROCEDURE — 3700000002 HC GENERAL ANESTHESIA TIME - EACH INCREMENTAL 1 MINUTE: Performed by: STUDENT IN AN ORGANIZED HEALTH CARE EDUCATION/TRAINING PROGRAM

## 2024-12-20 PROCEDURE — 2500000004 HC RX 250 GENERAL PHARMACY W/ HCPCS (ALT 636 FOR OP/ED): Performed by: NURSE ANESTHETIST, CERTIFIED REGISTERED

## 2024-12-20 PROCEDURE — 81025 URINE PREGNANCY TEST: CPT | Performed by: STUDENT IN AN ORGANIZED HEALTH CARE EDUCATION/TRAINING PROGRAM

## 2024-12-20 PROCEDURE — 7100000001 HC RECOVERY ROOM TIME - INITIAL BASE CHARGE: Performed by: STUDENT IN AN ORGANIZED HEALTH CARE EDUCATION/TRAINING PROGRAM

## 2024-12-20 PROCEDURE — 46505 CHEMODENERVATION ANAL MUSC: CPT | Performed by: STUDENT IN AN ORGANIZED HEALTH CARE EDUCATION/TRAINING PROGRAM

## 2024-12-20 PROCEDURE — 7100000010 HC PHASE TWO TIME - EACH INCREMENTAL 1 MINUTE: Performed by: STUDENT IN AN ORGANIZED HEALTH CARE EDUCATION/TRAINING PROGRAM

## 2024-12-20 PROCEDURE — 7100000002 HC RECOVERY ROOM TIME - EACH INCREMENTAL 1 MINUTE: Performed by: STUDENT IN AN ORGANIZED HEALTH CARE EDUCATION/TRAINING PROGRAM

## 2024-12-20 PROCEDURE — 3700000001 HC GENERAL ANESTHESIA TIME - INITIAL BASE CHARGE: Performed by: STUDENT IN AN ORGANIZED HEALTH CARE EDUCATION/TRAINING PROGRAM

## 2024-12-20 PROCEDURE — 46200 REMOVAL OF ANAL FISSURE: CPT | Performed by: STUDENT IN AN ORGANIZED HEALTH CARE EDUCATION/TRAINING PROGRAM

## 2024-12-20 PROCEDURE — 2500000001 HC RX 250 WO HCPCS SELF ADMINISTERED DRUGS (ALT 637 FOR MEDICARE OP): Performed by: STUDENT IN AN ORGANIZED HEALTH CARE EDUCATION/TRAINING PROGRAM

## 2024-12-20 PROCEDURE — 82947 ASSAY GLUCOSE BLOOD QUANT: CPT

## 2024-12-20 RX ORDER — OXYCODONE HYDROCHLORIDE 5 MG/1
5 TABLET ORAL ONCE AS NEEDED
Status: COMPLETED | OUTPATIENT
Start: 2024-12-20 | End: 2024-12-20

## 2024-12-20 RX ORDER — BUPIVACAINE HYDROCHLORIDE 5 MG/ML
INJECTION, SOLUTION PERINEURAL AS NEEDED
Status: DISCONTINUED | OUTPATIENT
Start: 2024-12-20 | End: 2024-12-20 | Stop reason: HOSPADM

## 2024-12-20 RX ORDER — MEPERIDINE HYDROCHLORIDE 25 MG/ML
12.5 INJECTION INTRAMUSCULAR; INTRAVENOUS; SUBCUTANEOUS EVERY 10 MIN PRN
Status: DISCONTINUED | OUTPATIENT
Start: 2024-12-20 | End: 2024-12-20 | Stop reason: HOSPADM

## 2024-12-20 RX ORDER — ONDANSETRON HYDROCHLORIDE 2 MG/ML
4 INJECTION, SOLUTION INTRAVENOUS ONCE AS NEEDED
Status: DISCONTINUED | OUTPATIENT
Start: 2024-12-20 | End: 2024-12-20 | Stop reason: HOSPADM

## 2024-12-20 RX ORDER — ALBUTEROL SULFATE 0.83 MG/ML
2.5 SOLUTION RESPIRATORY (INHALATION) EVERY 30 MIN PRN
Status: DISCONTINUED | OUTPATIENT
Start: 2024-12-20 | End: 2024-12-20 | Stop reason: HOSPADM

## 2024-12-20 RX ORDER — METOCLOPRAMIDE HYDROCHLORIDE 5 MG/ML
10 INJECTION INTRAMUSCULAR; INTRAVENOUS ONCE
Status: COMPLETED | OUTPATIENT
Start: 2024-12-20 | End: 2024-12-20

## 2024-12-20 RX ORDER — LABETALOL HYDROCHLORIDE 5 MG/ML
5 INJECTION, SOLUTION INTRAVENOUS EVERY 5 MIN PRN
Status: DISCONTINUED | OUTPATIENT
Start: 2024-12-20 | End: 2024-12-20 | Stop reason: HOSPADM

## 2024-12-20 RX ORDER — FAMOTIDINE 10 MG/ML
20 INJECTION INTRAVENOUS ONCE
Status: COMPLETED | OUTPATIENT
Start: 2024-12-20 | End: 2024-12-20

## 2024-12-20 RX ORDER — PROPOFOL 10 MG/ML
INJECTION, EMULSION INTRAVENOUS AS NEEDED
Status: DISCONTINUED | OUTPATIENT
Start: 2024-12-20 | End: 2024-12-20

## 2024-12-20 RX ORDER — FENTANYL CITRATE 50 UG/ML
50 INJECTION, SOLUTION INTRAMUSCULAR; INTRAVENOUS EVERY 5 MIN PRN
Status: DISCONTINUED | OUTPATIENT
Start: 2024-12-20 | End: 2024-12-20 | Stop reason: HOSPADM

## 2024-12-20 RX ORDER — DEXMEDETOMIDINE IN 0.9 % NACL 20 MCG/5ML
SYRINGE (ML) INTRAVENOUS AS NEEDED
Status: DISCONTINUED | OUTPATIENT
Start: 2024-12-20 | End: 2024-12-20

## 2024-12-20 RX ORDER — FENTANYL CITRATE 50 UG/ML
INJECTION, SOLUTION INTRAMUSCULAR; INTRAVENOUS AS NEEDED
Status: DISCONTINUED | OUTPATIENT
Start: 2024-12-20 | End: 2024-12-20

## 2024-12-20 RX ORDER — IPRATROPIUM BROMIDE 0.5 MG/2.5ML
500 SOLUTION RESPIRATORY (INHALATION) EVERY 30 MIN PRN
Status: DISCONTINUED | OUTPATIENT
Start: 2024-12-20 | End: 2024-12-20 | Stop reason: HOSPADM

## 2024-12-20 RX ORDER — LIDOCAINE HYDROCHLORIDE AND EPINEPHRINE 10; 10 UG/ML; MG/ML
INJECTION, SOLUTION INFILTRATION; PERINEURAL AS NEEDED
Status: DISCONTINUED | OUTPATIENT
Start: 2024-12-20 | End: 2024-12-20 | Stop reason: HOSPADM

## 2024-12-20 RX ORDER — LIDOCAINE HYDROCHLORIDE 10 MG/ML
INJECTION, SOLUTION INFILTRATION; PERINEURAL AS NEEDED
Status: DISCONTINUED | OUTPATIENT
Start: 2024-12-20 | End: 2024-12-20

## 2024-12-20 RX ORDER — HYDROMORPHONE HYDROCHLORIDE 0.2 MG/ML
0.2 INJECTION INTRAMUSCULAR; INTRAVENOUS; SUBCUTANEOUS EVERY 5 MIN PRN
Status: DISCONTINUED | OUTPATIENT
Start: 2024-12-20 | End: 2024-12-20 | Stop reason: HOSPADM

## 2024-12-20 RX ORDER — MIDAZOLAM HYDROCHLORIDE 1 MG/ML
INJECTION, SOLUTION INTRAMUSCULAR; INTRAVENOUS AS NEEDED
Status: DISCONTINUED | OUTPATIENT
Start: 2024-12-20 | End: 2024-12-20

## 2024-12-20 RX ORDER — ONDANSETRON HYDROCHLORIDE 2 MG/ML
INJECTION, SOLUTION INTRAVENOUS AS NEEDED
Status: DISCONTINUED | OUTPATIENT
Start: 2024-12-20 | End: 2024-12-20

## 2024-12-20 RX ORDER — SODIUM CHLORIDE, SODIUM LACTATE, POTASSIUM CHLORIDE, CALCIUM CHLORIDE 600; 310; 30; 20 MG/100ML; MG/100ML; MG/100ML; MG/100ML
40 INJECTION, SOLUTION INTRAVENOUS CONTINUOUS
Status: DISCONTINUED | OUTPATIENT
Start: 2024-12-20 | End: 2024-12-20 | Stop reason: HOSPADM

## 2024-12-20 RX ADMIN — HYDROMORPHONE HYDROCHLORIDE 0.2 MG: 0.2 INJECTION, SOLUTION INTRAMUSCULAR; INTRAVENOUS; SUBCUTANEOUS at 10:42

## 2024-12-20 RX ADMIN — FAMOTIDINE 20 MG: 10 INJECTION, SOLUTION INTRAVENOUS at 08:48

## 2024-12-20 RX ADMIN — HYDROMORPHONE HYDROCHLORIDE 0.2 MG: 0.2 INJECTION, SOLUTION INTRAMUSCULAR; INTRAVENOUS; SUBCUTANEOUS at 10:50

## 2024-12-20 RX ADMIN — METOCLOPRAMIDE 10 MG: 5 INJECTION, SOLUTION INTRAMUSCULAR; INTRAVENOUS at 08:48

## 2024-12-20 RX ADMIN — OXYCODONE 5 MG: 5 TABLET ORAL at 11:39

## 2024-12-20 RX ADMIN — SODIUM CHLORIDE, SODIUM LACTATE, POTASSIUM CHLORIDE, AND CALCIUM CHLORIDE 40 ML/HR: 600; 310; 30; 20 INJECTION, SOLUTION INTRAVENOUS at 08:45

## 2024-12-20 SDOH — HEALTH STABILITY: MENTAL HEALTH: CURRENT SMOKER: 0

## 2024-12-20 ASSESSMENT — PAIN SCALES - GENERAL
PAINLEVEL_OUTOF10: 8
PAINLEVEL_OUTOF10: 6
PAINLEVEL_OUTOF10: 6
PAINLEVEL_OUTOF10: 5 - MODERATE PAIN
PAINLEVEL_OUTOF10: 2
PAINLEVEL_OUTOF10: 4
PAINLEVEL_OUTOF10: 4
PAINLEVEL_OUTOF10: 6
PAINLEVEL_OUTOF10: 6
PAINLEVEL_OUTOF10: 5 - MODERATE PAIN

## 2024-12-20 ASSESSMENT — PAIN DESCRIPTION - LOCATION
LOCATION: RECTUM
LOCATION: OTHER (COMMENT)

## 2024-12-20 ASSESSMENT — PAIN SCALES - WONG BAKER: WONGBAKER_NUMERICALRESPONSE: HURTS WHOLE LOT

## 2024-12-20 ASSESSMENT — PAIN DESCRIPTION - DESCRIPTORS
DESCRIPTORS: BURNING;STABBING
DESCRIPTORS: STABBING;BURNING;SHARP
DESCRIPTORS: THROBBING
DESCRIPTORS: BURNING;STABBING
DESCRIPTORS: ACHING

## 2024-12-20 ASSESSMENT — COLUMBIA-SUICIDE SEVERITY RATING SCALE - C-SSRS
2. HAVE YOU ACTUALLY HAD ANY THOUGHTS OF KILLING YOURSELF?: NO
1. IN THE PAST MONTH, HAVE YOU WISHED YOU WERE DEAD OR WISHED YOU COULD GO TO SLEEP AND NOT WAKE UP?: NO
6. HAVE YOU EVER DONE ANYTHING, STARTED TO DO ANYTHING, OR PREPARED TO DO ANYTHING TO END YOUR LIFE?: NO

## 2024-12-20 ASSESSMENT — PAIN - FUNCTIONAL ASSESSMENT
PAIN_FUNCTIONAL_ASSESSMENT: 0-10

## 2024-12-20 NOTE — PERIOPERATIVE NURSING NOTE
ADMITTED TO SDS. ALERT AND ORIENTED. GAIT STEADY. NO NOTED RESP. DISTRESS. ORIENTED TO THE SURROUNDINGS. VSS. MEDS GIVEN ACCORDING TO MD ORDERS. IV STARTED AND FLUIDS STARTED AT KVO. ANESTHESIA IN TO SEE PT.  CALL BELL IN REACH. READY FOR PROCEDURE. NO NOTED DISTRESS.

## 2024-12-20 NOTE — DISCHARGE INSTRUCTIONS
Sitz baths as needed.     Avoid constipation, take miralax daily and can increase or decrease dose as needed to have a soft BM

## 2024-12-20 NOTE — ANESTHESIA POSTPROCEDURE EVALUATION
Patient: Hansa Mix    Procedure Summary       Date: 12/20/24 Room / Location: [facility] OR 12 / Virtual [facility] OR    Anesthesia Start: 0959 Anesthesia Stop: 1025    Procedures:       Injection Therapeutic Agent Anus/Rectum      EXCISION,FISSURE OR FISTULA,ANUS OR RECTUM Diagnosis:       Anal fissure, unspecified      (Anal fissure, unspecified [K60.2])    Surgeons: Jessica Blakely MD Responsible Provider: Cristopher Ashford DO    Anesthesia Type: MAC ASA Status: 3            Anesthesia Type: MAC    Vitals Value Taken Time   /60 12/20/24 1120   Temp 36.2 °C (97.2 °F) 12/20/24 1120   Pulse 85 12/20/24 1120   Resp 16 12/20/24 1120   SpO2 96 % 12/20/24 1120       Anesthesia Post Evaluation    Patient location during evaluation: bedside  Patient participation: complete - patient participated  Level of consciousness: awake and alert  Pain management: adequate  Multimodal analgesia pain management approach  Airway patency: patent  Two or more strategies used to mitigate risk of obstructive sleep apnea  Cardiovascular status: acceptable  Respiratory status: acceptable  Hydration status: acceptable  Postoperative Nausea and Vomiting: none        There were no known notable events for this encounter.    
None known

## 2024-12-20 NOTE — ANESTHESIA PREPROCEDURE EVALUATION
Patient: Hansa Mix    Procedure Information       Date/Time: 12/20/24 0930    Procedure: Injection Therapeutic Agent Anus/Rectum    Location: [facility] OR 12 / [facility]    Surgeons: Jessica Blakely MD          Past Medical History:   Diagnosis Date    Depression     Diabetes mellitus (Multi) July 2024    Diverticulitis of colon     Fissure, anal 2017, October 2024    Rectal bleeding      Past Surgical History:   Procedure Laterality Date    APPENDECTOMY         Relevant Problems   Anesthesia (within normal limits)      Neuro   (+) Anxiety   (+) Depression, major, recurrent (CMS-HCC)   (+) ZACH (generalized anxiety disorder)   (+) MDD (major depressive disorder), recurrent episode, moderate   (+) PTSD (post-traumatic stress disorder)      Endocrine   (+) Type 2 diabetes mellitus without complication, without long-term current use of insulin (Multi)      ID   (+) Hidradenitis suppurativa       Clinical information reviewed:    Allergies  Meds     OB Status           NPO Detail:  No data recorded     Physical Exam    Airway  Mallampati: II  TM distance: >3 FB  Neck ROM: full     Cardiovascular   Comments: deferred   Dental   Comments: No loose teeth   Pulmonary   Comments: deferred   Abdominal     Comments: deferred           Anesthesia Plan    History of general anesthesia?: yes  History of complications of general anesthesia?: no    ASA 3     MAC     The patient is not a current smoker.  Patient was not previously instructed to abstain from smoking on day of procedure.  Patient did not smoke on day of procedure.  Education provided regarding risk of obstructive sleep apnea.  intravenous induction   Postoperative administration of opioids is intended.  Anesthetic plan and risks discussed with patient.  Use of blood products discussed with patient who consented to blood products.    Plan discussed with CRNA and CAA.

## 2024-12-20 NOTE — OP NOTE
Injection Therapeutic Agent Anus/Rectum Operative Note     Date: 2024  OR Location: [facility]    Name: Hansa Mix, : 1985, Age: 39 y.o., MRN: [id number], Sex: female    Diagnosis  Pre-op Diagnosis      * Anal fissure, unspecified [K60.2] Post-op Diagnosis     * Anal fissure, unspecified [K60.2]     Procedures  Injection Therapeutic Agent Anus/Rectum  79347 - DE CHEMODENERVATION INTERNAL ANAL SPHINCTER    EXCISION,FISSURE OR FISTULA,ANUS OR RECTUM  96853 - DE FISSURECTOMY INCL SPHINCTEROTOMY WHEN PERFORMED      Surgeons      * Jessica Blakely - Primary    Resident/Fellow/Other Assistant:  Surgeons and Role:  * No surgeons found with a matching role *    Staff:   Circulator: Alondra Hammonds Person: Flaquita  Surgical Assistant: Rivka    Anesthesia Staff: Anesthesiologist: Cristopher Ashford DO  CRNA: LÁZARO Patrick-CRNA  SRNA: Gilda Ramos    Procedure Summary  Anesthesia: Monitor Anesthesia Care  ASA: III  Estimated Blood Loss: 5mL  Intra-op Medications:   Administrations occurring from 0930 to 1030 on 24:   Medication Name Total Dose   lidocaine-epinephrine (Xylocaine W/EPI) 1 %-1:100,000 injection 10 mL   BUPivacaine HCl (Marcaine) 0.5 % (5 mg/mL) injection 10 mL   onabotulinumtoxinA (Botox) injection 100 Units 100 Units   lactated Ringer's infusion Cannot be calculated   dexmedeTOMidine 4 mcg/mL in NS 5 mL syringe 16 mcg   fentaNYL PF 0.05 mg/mL 50 mcg   lidocaine (Xylocaine) 1 % 4 mL   midazolam (Versed) 1 mg/1 mL 4 mg   ondansetron (Zofran) 2 mg/mL injection 4 mg   propofol (Diprivan) injection 10 mg/mL 250 mg              Anesthesia Record               Intraprocedure I/O Totals          Intake    lactated Ringer's infusion 600.00 mL    Total Intake 600 mL       Output    Est. 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  female who is having surgery for Anal fissure, unspecified [K60.2].     The patient was seen in the preoperative area. The risks, benefits, complications, treatment options, non-operative alternatives, expected recovery and outcomes were discussed with the patient. The possibilities of reaction to medication, pulmonary aspiration, injury to surrounding structures, bleeding, recurrent infection, the need for additional procedures, failure to diagnose a condition, and creating a complication requiring transfusion or operation were discussed with the patient. The patient concurred with the proposed plan, giving informed consent.  The site of surgery was properly noted/marked if necessary per policy. The patient has been actively warmed in preoperative area. Preoperative antibiotics are not indicated. Venous thrombosis prophylaxis are not indicated.    Procedure Details:   The patient was brought to the operating room placed on the operating table in the supine position.  MAC anesthesia was induced.  She was transitioned into the lithotomy position with candycane stirrups.  She was prepped and draped.  A timeout was performed.  A digital rectal exam was performed which did not reveal any gross abnormality.  A bilateral pudendal and perianal nerve block was performed with local anesthesia.  A medium Hill-Balderas was placed in the anal canal and circumferential inspection only showed enlarged hemorrhoids with a chronic appearing posterior midline anal fissure.  There were heaped up edges of scar tissue.  The sphincter was tight and contracted.  A fissurectomy was performed bluntly scraping off the edges back to healthy tissue.  100 units of Botox was then drawn up and mixed with 2 cc of injectable saline.  It was injected into the internal sphincter muscle in the 4 cardinal directions.  The patient tolerated the procedure well.  She was transition back to supine and taken to PACU in stable condition.  Complications:   None; patient tolerated the procedure well.    Disposition: PACU - hemodynamically stable.  Condition: stable       Jessica Blakely  Phone Number: [phone]

## 2024-12-20 NOTE — POST-PROCEDURE NOTE
Patient returned to Phase II alert and oriented.  IV intact and infusing with LR at KVO.  She is having 5/10 throbbing discomfort in her rectum area.  Ice applied.  Pain medication ordered from anesthesia.    Patient given beverage and snack, tolerating without difficulty.  Pain medication administered, see MAR    1200:  Home going instructions reviewed with patient and family, no questions or concerns

## 2024-12-23 ENCOUNTER — APPOINTMENT (OUTPATIENT)
Dept: SURGERY | Facility: CLINIC | Age: 39
End: 2024-12-23
Payer: COMMERCIAL

## 2024-12-24 ENCOUNTER — APPOINTMENT (OUTPATIENT)
Dept: PRIMARY CARE | Facility: CLINIC | Age: 39
End: 2024-12-24
Payer: COMMERCIAL

## 2025-01-02 PROCEDURE — RXMED WILLOW AMBULATORY MEDICATION CHARGE

## 2025-01-03 ENCOUNTER — PHARMACY VISIT (OUTPATIENT)
Dept: PHARMACY | Facility: CLINIC | Age: 40
End: 2025-01-03
Payer: COMMERCIAL

## 2025-01-15 DIAGNOSIS — E11.9 TYPE 2 DIABETES MELLITUS WITHOUT COMPLICATION, WITHOUT LONG-TERM CURRENT USE OF INSULIN (MULTI): Primary | ICD-10-CM

## 2025-01-15 RX ORDER — TIRZEPATIDE 15 MG/.5ML
15 INJECTION, SOLUTION SUBCUTANEOUS WEEKLY
Qty: 2 ML | Refills: 3 | Status: SHIPPED | OUTPATIENT
Start: 2025-01-15

## 2025-01-17 ENCOUNTER — OFFICE VISIT (OUTPATIENT)
Dept: SURGERY | Facility: CLINIC | Age: 40
End: 2025-01-17
Payer: COMMERCIAL

## 2025-01-17 VITALS
HEART RATE: 82 BPM | HEIGHT: 67 IN | TEMPERATURE: 97.7 F | OXYGEN SATURATION: 98 % | DIASTOLIC BLOOD PRESSURE: 70 MMHG | BODY MASS INDEX: 36.95 KG/M2 | WEIGHT: 235.4 LBS | SYSTOLIC BLOOD PRESSURE: 128 MMHG

## 2025-01-17 DIAGNOSIS — Z09 POSTOPERATIVE EXAMINATION: Primary | ICD-10-CM

## 2025-01-17 PROCEDURE — 3078F DIAST BP <80 MM HG: CPT | Performed by: STUDENT IN AN ORGANIZED HEALTH CARE EDUCATION/TRAINING PROGRAM

## 2025-01-17 PROCEDURE — 1036F TOBACCO NON-USER: CPT | Performed by: STUDENT IN AN ORGANIZED HEALTH CARE EDUCATION/TRAINING PROGRAM

## 2025-01-17 PROCEDURE — 3008F BODY MASS INDEX DOCD: CPT | Performed by: STUDENT IN AN ORGANIZED HEALTH CARE EDUCATION/TRAINING PROGRAM

## 2025-01-17 PROCEDURE — 3074F SYST BP LT 130 MM HG: CPT | Performed by: STUDENT IN AN ORGANIZED HEALTH CARE EDUCATION/TRAINING PROGRAM

## 2025-01-17 PROCEDURE — 99211 OFF/OP EST MAY X REQ PHY/QHP: CPT | Performed by: STUDENT IN AN ORGANIZED HEALTH CARE EDUCATION/TRAINING PROGRAM

## 2025-01-17 ASSESSMENT — PATIENT HEALTH QUESTIONNAIRE - PHQ9
2. FEELING DOWN, DEPRESSED OR HOPELESS: NOT AT ALL
SUM OF ALL RESPONSES TO PHQ9 QUESTIONS 1 AND 2: 0
1. LITTLE INTEREST OR PLEASURE IN DOING THINGS: NOT AT ALL

## 2025-01-17 ASSESSMENT — PAIN SCALES - GENERAL: PAINLEVEL_OUTOF10: 3

## 2025-01-17 ASSESSMENT — ENCOUNTER SYMPTOMS: ANAL BLEEDING: 0

## 2025-01-17 NOTE — PROGRESS NOTES
Subjective   Patient ID: Hansa Mix is a 39 y.o. female who presents for Post-op (s/p Injection Therapeutic Agent Anus/Rectum/3/10 pain rectum).  Doing well after Botox injection for anal fissure.  She reports the pain is minimal, she is occasionally using the Nitropaste.  No bleeding.  No incontinence.  She is now able to sit and is back to work.        Review of Systems   Gastrointestinal:  Negative for anal bleeding.       Objective   Physical Exam  Vitals and nursing note reviewed.   Genitourinary:     Comments: Deferred, no concerns  Neurological:      Mental Status: She is alert.         Assessment/Plan   Problem List Items Addressed This Visit    None  Visit Diagnoses         Codes    Postoperative examination    -  Primary Z09        Doing well after Botox injection for anal fissure.  Pain has significantly decreased.  Explained that the effects of Botox will wear off in about 3 months.  If she continues to have pain or if it worsens at any point, encouraged her to call back so we can discuss other options moving forward.  Can intermittently use the nitroglycerin as she is.  All questions were answered.  Follow-up with me as needed.         Jessica Blakely MD 01/17/25

## 2025-01-23 ENCOUNTER — PHARMACY VISIT (OUTPATIENT)
Dept: PHARMACY | Facility: CLINIC | Age: 40
End: 2025-01-23
Payer: COMMERCIAL

## 2025-01-23 PROCEDURE — RXOTC WILLOW AMBULATORY OTC CHARGE

## 2025-01-23 PROCEDURE — RXMED WILLOW AMBULATORY MEDICATION CHARGE

## 2025-01-24 LAB
ATRIAL RATE: 86 BPM
P AXIS: 48 DEGREES
P OFFSET: 190 MS
P ONSET: 146 MS
PR INTERVAL: 150 MS
Q ONSET: 221 MS
QRS COUNT: 14 BEATS
QRS DURATION: 76 MS
QT INTERVAL: 354 MS
QTC CALCULATION(BAZETT): 423 MS
QTC FREDERICIA: 399 MS
R AXIS: 56 DEGREES
T AXIS: 28 DEGREES
T OFFSET: 398 MS
VENTRICULAR RATE: 86 BPM

## 2025-02-06 NOTE — PROGRESS NOTES
History Of Present Illness  Hansa Mix is a 39 y.o. female presenting for follow-up of anal fissure.  She underwent Botox at the end of December and was seen a month later and had improvement in her symptoms.  Now it has been about 3 weeks and her symptoms are starting to get worse again.  She has more pain at rest and with bowel movements.     Past Medical History  She has a past medical history of Depression, Diabetes mellitus (Multi) (July 2024), Diverticulitis of colon, Fissure, anal (2017, October 2024), and Rectal bleeding.    Surgical History  She has a past surgical history that includes Appendectomy and Anus surgery (N/A, 12/20/2024).     Social History  She reports that she quit smoking about 9 months ago. Her smoking use included cigarettes. She has never used smokeless tobacco. She reports current alcohol use of about 1.0 standard drink of alcohol per week. She reports that she does not currently use drugs after having used the following drugs: Marijuana.    Family History  Family History   Problem Relation Name Age of Onset    Depression Mother chio     Depression Father remedios     Diabetes Father remedios     Hearing loss Father remedios     Heart disease Father remedios     Hypertension Father remedios     Skin cancer Maternal Grandmother      Breast cancer Maternal Grandmother      Colon cancer Maternal Grandfather Jluis         Allergies  Patient has no known allergies.    Review of Systems   Constitutional:  Negative for chills, fever and unexpected weight change.   HENT:  Negative for sneezing, sore throat, trouble swallowing and voice change.    Respiratory:  Negative for chest tightness and shortness of breath.    Cardiovascular:  Negative for chest pain and palpitations.   Gastrointestinal:  Positive for rectal pain. Negative for abdominal pain, blood in stool, diarrhea, nausea and vomiting.   Endocrine: Negative for cold intolerance and heat intolerance.   Genitourinary:  Negative for decreased urine volume,  "dysuria and hematuria.   Musculoskeletal:  Negative for arthralgias and gait problem.   Skin:  Negative for rash and wound.   Neurological:  Negative for facial asymmetry, speech difficulty and headaches.   Hematological:  Negative for adenopathy. Does not bruise/bleed easily.   Psychiatric/Behavioral:  Negative for self-injury and suicidal ideas.         Physical Exam  Neurological:      Mental Status: She is alert.          Last Recorded Vitals  Height 1.702 m (5' 7\"), weight 104 kg (230 lb).       Assessment/Plan   Problem List Items Addressed This Visit             ICD-10-CM    Anal fissure, unspecified - Primary K60.2    Relevant Orders    Case Request Operating Room: SPHINCTEROTOMY, ANAL (Completed)     She was seen about a month after Botox injection for anal fissure and had improvement in her symptoms but not complete resolution.  Over the past 3 weeks her symptoms have gotten worse.  We discussed proceeding with a sphincterotomy.  I described the procedure in detail including risks and benefits and the expected postoperative course.  I do not think doing another Botox injection would be beneficial to her at this time.  All questions were answered.  She agrees to proceed.  Will schedule at her convenience.      Jessica Blakely MD    "

## 2025-02-07 ENCOUNTER — TELEMEDICINE (OUTPATIENT)
Dept: SURGERY | Facility: CLINIC | Age: 40
End: 2025-02-07
Payer: COMMERCIAL

## 2025-02-07 VITALS — BODY MASS INDEX: 36.1 KG/M2 | WEIGHT: 230 LBS | HEIGHT: 67 IN

## 2025-02-07 DIAGNOSIS — K60.2 ANAL FISSURE, UNSPECIFIED: Primary | ICD-10-CM

## 2025-02-07 PROCEDURE — 99215 OFFICE O/P EST HI 40 MIN: CPT | Mod: 95 | Performed by: STUDENT IN AN ORGANIZED HEALTH CARE EDUCATION/TRAINING PROGRAM

## 2025-02-07 RX ORDER — POLYETHYLENE GLYCOL 3350 17 G/17G
17 POWDER, FOR SOLUTION ORAL EVERY OTHER DAY
COMMUNITY

## 2025-02-07 ASSESSMENT — PATIENT HEALTH QUESTIONNAIRE - PHQ9
SUM OF ALL RESPONSES TO PHQ9 QUESTIONS 1 AND 2: 0
1. LITTLE INTEREST OR PLEASURE IN DOING THINGS: NOT AT ALL
2. FEELING DOWN, DEPRESSED OR HOPELESS: NOT AT ALL

## 2025-02-07 ASSESSMENT — ENCOUNTER SYMPTOMS
FEVER: 0
SORE THROAT: 0
CHEST TIGHTNESS: 0
SPEECH DIFFICULTY: 0
DYSURIA: 0
BLOOD IN STOOL: 0
HEMATURIA: 0
SHORTNESS OF BREATH: 0
ABDOMINAL PAIN: 0
DIARRHEA: 0
ARTHRALGIAS: 0
UNEXPECTED WEIGHT CHANGE: 0
BRUISES/BLEEDS EASILY: 0
VOMITING: 0
WOUND: 0
TROUBLE SWALLOWING: 0
RECTAL PAIN: 1
PALPITATIONS: 0
HEADACHES: 0
NAUSEA: 0
CHILLS: 0
VOICE CHANGE: 0
ADENOPATHY: 0
FACIAL ASYMMETRY: 0

## 2025-02-07 ASSESSMENT — PAIN SCALES - GENERAL: PAINLEVEL_OUTOF10: 4

## 2025-02-07 NOTE — Clinical Note
Hello,  Can you please schedule this patient for a sphinterotomy with Dr. Blakely  at [facility] on March 5th 2025.   If this day is not available she stated she can do Wednesday March 19, 2025, please notify   Thank you   Farrah Jiang LPN

## 2025-02-07 NOTE — PATIENT INSTRUCTIONS
Thank you for scheduling surgery with Dr. Blakely   Below you will find your Surgery Itinerary to include dates, times, and locations for appointments involved with your procedure.      A representative from the hospital will contact you directly to schedule any Pre Admission Testing appointments needed.    On the day before the scheduled surgery, please call the Same Day Surgery department between 2-4 pm for a time of arrival for the day of procedure.  [facility] [phone]    Nothing to eat or drink after midnight the night before surgery    Surgery with Dr. Blakely at [facility] - [address], [place] [address]  On:  Wednesday March 5, 2025    Postoperative appointment is scheduled at Surgery office locatoins: [facility] [address], [place] [address] [phone]     On: Friday April 4, 2025 @ 3pm    *Please note, you may receive a call from our financial counselors if you have a financial liability greater than $250.         [facility]  Pre - Operative Instructions     Your time of arrival for surgery is available the day before surgery. *If the surgery is on a Monday, or the Tuesday following a Monday Holiday, please call Same Day Surgery the Friday before your surgery date.*    DO NOT EAT OR DRINK ANYTHING AFTER MIDNIGHT THE NIGHT BEFORE SURGERY. This includes any beverages (coffee, water, soda, etc.), hard candy, gum or mints. If this is not followed, surgery may be canceled. Please avoid eating a large meal the evening before surgery. Please do not DRINK ALCOHOL or SMOKE FOR 24 HOURS before surgery.     Insulin Instructions - Please do not take any short acting Insulin (Regular or NPH). Do not take any oral diabetic medication on the day of surgery. Long acting Insulins may be taken (Lantus). We will check your blood sugar and administer at the hospital the day of surgery. Patient who have Insulin pumps are to  make NO adjustments.     Prescription Medications - You are encouraged to take prescription medications including heart, blood pressure, anti-seizure, anxiety, breathing medications (including inhalers) with exception to diabetic medications prior to arriving at the hospital the day of surgery. You may take prescribed pain medications as needed. Please remember the dose and time taken so we may inform your Anesthesiologist.     Please bring the name, dosage, and frequency of your medications if you did not provide these on the day of Pre Admission Testing. You may bring the actual bottles if this would be easier for you.     Please bring your prescribed inhalers with you the morning of surgery.     Patients on Anti-platelet and Anticoagulant agents, please read the following:  ASA, NSAIDS stop 5 days prior to surgery  Coumadin stop 5 days prior to surgery unless bridging therapy is needed (metal valve replacement, cardiac stent placement) - if so please speak to your Cardiologist or prescribing physician.   Other anti-platelet and anticoagulant agents:  Plavix (Clopidogrel) stop 5 days prior to surgery  Brilinta (Ticagrelor) stop 5 days prior to surgery   Effient (Prasugrel) stop 7 days prior to surgery   Lovenox (Enoxaparin) stop 24 hours prior to surgery  Arixtra (Fondaparinux) stop 5 days prior to surgery  Xarelto (Rivaroxaban) stop 3 days prior to surgery  Pradaxa (Dabigatran) stop 5 days prior to surgery  Eliquis (Apixaban) stop 3 days prior to surgery   Savaysa (Endoxaban) stop days prior to surgery     Please stop all herbal medications 2 weeks prior to surgery     C-PAP Devices - If you have a C-PAP device at home, bring it with you on our day of surgery if your surgery requires you to stay overnight.     Please bring a copy of any Advanced Directives the day of surgery if you did not provide it at Pre Admission Testing. These documents are living lanza and durable power of  for healthcare.      Please notify your physician/surgeon if you develop a cold, sore throat, fever, flu symptoms, COVID symptoms or any changes in your physical conditions.     A shower or bath is preferred the evening before or the morning of surgery.     Remove jewelry before admission to the hospital. It is no longer permitted to tape rings. We ask that you leave all valuables at home. Any items of value will be given to a family member or locked up by security.   If you have a body piercing g that you cannot remove, it is recommended that you have it removed professionally and have a plastic spacer inserted. There is a risk for surgical burns with jewelry left in place.     Remove or wear minimal makeup the day of surgery. You will be asked to remove glasses and contact lenses prior to surgery. Please bring a glass case and/or contact lens case with you. These items are not provided.     Dentures and partials are usually removed prior to surgery. A denture cup will be provided for you.       You may be asked to remove nail polish. Acrylic nails are now acceptable.     Wear loose comfortable clothing that you will be able to fit over bandages when you leave the hospitals (as appropriate for your surgery).    Patients that are under the age of 18 years must have a parent or guardian present the day of surgery.     Family members of significant others may stay with you on the Same Day Surgery unit. While you are in surgery, they may wait for you in the family waiting area. The physician will speak to the waiting family, if permitted by the patient, after surgery.     Changes or delays in the surgery schedule may occur due to emergencies. The hospital will notify you if this occurs. We apologize for any inconveniences this may present.     You must have a responsible  available to drive you home after surgery. You will not be permitted to drive yourself home after surgery if you have received any anesthesia or sedation during  your procedure.     Please visit our website at [email or web address] for more information regarding [facility] services.      For questions about your Pre Admission Testing (PAT)  [facility] [phone]  [facility] [phone]    Thank you for choosing [facility]!

## 2025-02-13 PROCEDURE — RXMED WILLOW AMBULATORY MEDICATION CHARGE

## 2025-02-14 ENCOUNTER — PHARMACY VISIT (OUTPATIENT)
Dept: PHARMACY | Facility: CLINIC | Age: 40
End: 2025-02-14
Payer: COMMERCIAL

## 2025-02-26 ENCOUNTER — PRE-ADMISSION TESTING (OUTPATIENT)
Dept: PREADMISSION TESTING | Facility: HOSPITAL | Age: 40
End: 2025-02-26
Payer: COMMERCIAL

## 2025-02-26 ENCOUNTER — LAB (OUTPATIENT)
Dept: LAB | Facility: HOSPITAL | Age: 40
End: 2025-02-26
Payer: COMMERCIAL

## 2025-02-26 VITALS
DIASTOLIC BLOOD PRESSURE: 77 MMHG | RESPIRATION RATE: 18 BRPM | TEMPERATURE: 97.2 F | HEART RATE: 77 BPM | SYSTOLIC BLOOD PRESSURE: 124 MMHG

## 2025-02-26 DIAGNOSIS — Z01.818 PRE-OP EXAMINATION: Primary | ICD-10-CM

## 2025-02-26 DIAGNOSIS — Z01.818 ENCOUNTER FOR OTHER PREPROCEDURAL EXAMINATION: Primary | ICD-10-CM

## 2025-02-26 LAB
ANION GAP SERPL CALCULATED.3IONS-SCNC: 10 MMOL/L (ref 10–20)
BASOPHILS # BLD AUTO: 0.09 X10*3/UL (ref 0–0.1)
BASOPHILS NFR BLD AUTO: 1 %
BUN SERPL-MCNC: 7 MG/DL (ref 6–23)
CALCIUM SERPL-MCNC: 9.3 MG/DL (ref 8.6–10.3)
CHLORIDE SERPL-SCNC: 107 MMOL/L (ref 98–107)
CO2 SERPL-SCNC: 25 MMOL/L (ref 21–32)
CREAT SERPL-MCNC: 0.72 MG/DL (ref 0.5–1.05)
EGFRCR SERPLBLD CKD-EPI 2021: >90 ML/MIN/1.73M*2
EOSINOPHIL # BLD AUTO: 0.3 X10*3/UL (ref 0–0.7)
EOSINOPHIL NFR BLD AUTO: 3.3 %
ERYTHROCYTE [DISTWIDTH] IN BLOOD BY AUTOMATED COUNT: 12.8 % (ref 11.5–14.5)
GLUCOSE SERPL-MCNC: 94 MG/DL (ref 74–99)
HCT VFR BLD AUTO: 42.9 % (ref 36–46)
HGB BLD-MCNC: 14.5 G/DL (ref 12–16)
IMM GRANULOCYTES # BLD AUTO: 0.03 X10*3/UL (ref 0–0.7)
IMM GRANULOCYTES NFR BLD AUTO: 0.3 % (ref 0–0.9)
LYMPHOCYTES # BLD AUTO: 3.45 X10*3/UL (ref 1.2–4.8)
LYMPHOCYTES NFR BLD AUTO: 38.1 %
MCH RBC QN AUTO: 30 PG (ref 26–34)
MCHC RBC AUTO-ENTMCNC: 33.8 G/DL (ref 32–36)
MCV RBC AUTO: 89 FL (ref 80–100)
MONOCYTES # BLD AUTO: 0.49 X10*3/UL (ref 0.1–1)
MONOCYTES NFR BLD AUTO: 5.4 %
NEUTROPHILS # BLD AUTO: 4.69 X10*3/UL (ref 1.2–7.7)
NEUTROPHILS NFR BLD AUTO: 51.9 %
NRBC BLD-RTO: 0 /100 WBCS (ref 0–0)
PLATELET # BLD AUTO: 349 X10*3/UL (ref 150–450)
POTASSIUM SERPL-SCNC: 4.3 MMOL/L (ref 3.5–5.3)
RBC # BLD AUTO: 4.84 X10*6/UL (ref 4–5.2)
SODIUM SERPL-SCNC: 138 MMOL/L (ref 136–145)
WBC # BLD AUTO: 9.1 X10*3/UL (ref 4.4–11.3)

## 2025-02-26 PROCEDURE — 80048 BASIC METABOLIC PNL TOTAL CA: CPT

## 2025-02-26 PROCEDURE — 85025 COMPLETE CBC W/AUTO DIFF WBC: CPT

## 2025-02-26 PROCEDURE — 99214 OFFICE O/P EST MOD 30 MIN: CPT

## 2025-02-26 RX ORDER — ACETAMINOPHEN 500 MG
1000 TABLET ORAL EVERY 6 HOURS PRN
COMMUNITY

## 2025-02-26 ASSESSMENT — ENCOUNTER SYMPTOMS
ANAL BLEEDING: 1
NEUROLOGICAL NEGATIVE: 1
EYES NEGATIVE: 1
HEMATOLOGIC/LYMPHATIC NEGATIVE: 1
ROS GI COMMENTS: ANAL PAIN
MUSCULOSKELETAL NEGATIVE: 1
ENDOCRINE NEGATIVE: 1
CONSTITUTIONAL NEGATIVE: 1
CARDIOVASCULAR NEGATIVE: 1
RESPIRATORY NEGATIVE: 1
ALLERGIC/IMMUNOLOGIC NEGATIVE: 1
PSYCHIATRIC NEGATIVE: 1

## 2025-02-26 ASSESSMENT — DUKE ACTIVITY SCORE INDEX (DASI)
CAN YOU WALK A BLOCK OR TWO ON LEVEL GROUND: YES
CAN YOU DO MODERATE WORK AROUND THE HOUSE LIKE VACUUMING, SWEEPING FLOORS OR CARRYING GROCERIES: YES
CAN YOU DO HEAVY WORK AROUND THE HOUSE LIKE SCRUBBING FLOORS OR LIFTING AND MOVING HEAVY FURNITURE: YES
CAN YOU DO YARD WORK LIKE RAKING LEAVES, WEEDING OR PUSHING A MOWER: YES
DASI METS SCORE: 9.9
CAN YOU HAVE SEXUAL RELATIONS: YES
CAN YOU TAKE CARE OF YOURSELF (EAT, DRESS, BATHE, OR USE TOILET): YES
CAN YOU WALK INDOORS, SUCH AS AROUND YOUR HOUSE: YES
CAN YOU RUN A SHORT DISTANCE: YES
CAN YOU CLIMB A FLIGHT OF STAIRS OR WALK UP A HILL: YES
CAN YOU DO LIGHT WORK AROUND THE HOUSE LIKE DUSTING OR WASHING DISHES: YES
TOTAL_SCORE: 58.2
CAN YOU PARTICIPATE IN MODERATE RECREATIONAL ACTIVITIES LIKE GOLF, BOWLING, DANCING, DOUBLES TENNIS OR THROWING A BASEBALL OR FOOTBALL: YES
CAN YOU PARTICIPATE IN STRENOUS SPORTS LIKE SWIMMING, SINGLES TENNIS, FOOTBALL, BASKETBALL, OR SKIING: YES

## 2025-02-26 ASSESSMENT — PAIN DESCRIPTION - DESCRIPTORS: DESCRIPTORS: SHARP;ACHING

## 2025-02-26 ASSESSMENT — PAIN SCALES - GENERAL: PAINLEVEL_OUTOF10: 5 - MODERATE PAIN

## 2025-02-26 ASSESSMENT — PAIN - FUNCTIONAL ASSESSMENT: PAIN_FUNCTIONAL_ASSESSMENT: 0-10

## 2025-02-26 NOTE — PREPROCEDURE INSTRUCTIONS
Medication List            Accurate as of February 26, 2025  3:16 PM. Always use your most recent med list.                acetaminophen 500 mg tablet  Commonly known as: Tylenol  Medication Adjustments for Surgery: Take/Use as prescribed     Mounjaro 15 mg/0.5 mL pen injector  Generic drug: tirzepatide  Inject 15 mg under the skin 1 (one) time per week.  Additional Medication Adjustments for Surgery: Take last dose 7 days before surgery  Notes to patient: Last dose was 2/21/25     nitroglycerin in white petrolatum ointment  Apply topically to affected area 2 times a day.  Medication Adjustments for Surgery: Take/Use as prescribed     ondansetron 4 mg tablet  Commonly known as: Zofran  Take 1 tablet (4 mg) by mouth every 8 hours if needed for nausea or vomiting.  Medication Adjustments for Surgery: Take/Use as prescribed     polyethylene glycol 17 gram packet  Commonly known as: Glycolax, Miralax  Medication Adjustments for Surgery: Do Not take on the morning of surgery                       Why must I stop eating and drinking near surgery time?  With sedation, food or liquid in your stomach can enter your lungs causing serious complications  Increases nausea and vomiting    When do I need to stop eating and drinking before my surgery?   Do not eat or drink after midnight the night before your surgery/procedure.  You may have small sips of water to take your medication.    PAT DISCHARGE INSTRUCTIONS    Please call the Same Day Surgery (SDS) Department of the hospital where your procedure will be performed after 2:00 PM the day before your surgery. If you are scheduled on a Monday, or a Tuesday following a Monday holiday, you will need to call on the last business day prior to your surgery.    [id number] [place] [address] [phone]  [facility]  [address] [place] [address],  [address]  [phone]  [facility]  [address] [place] [address]  [phone]    Please let your surgeon know if:      You develop any open sores, shingles, burning or painful urination as these may increase your risk of an infection.   You no longer wish to have the surgery.   Any other personal circumstances change that may lead to the need to cancel or defer this surgery-such as being sick or getting admitted to any hospital within one week of your planned procedure.    Your contact details change, such as a change of address or phone number.    Starting now:     Please DO NOT drink alcohol or smoke for 24 hours before surgery. It is well known that quitting smoking can make a huge difference to your health and recovery from surgery. The longer you abstain from smoking, the better your chances of a healthy recovery. If you need help with quitting, call 1-655-QUIT-NOW to be connected to a trained counselor who will discuss the best methods to help you quit.     Before your surgery:    Please stop all supplements 7 days prior to surgery. Or as directed by your surgeon.   Please stop taking NSAID pain medicine such as Advil and Motrin 7 days before surgery.    If you develop any fever, cough, cold, rashes, cuts, scratches, scrapes, urinary symptoms or infection anywhere on your body (including teeth and gums) prior to surgery, please call your surgeon’s office as soon as possible. This may require treatment to reduce the chance of cancellation on the day of surgery.    The day before your surgery:   DIET- Please follow the diet instructions at the top of your packet.   Get a good night’s rest.  Use the special soap for bathing if you have been instructed to use one.    Scheduled surgery times may change and you will be notified if this occurs - please check your personal voicemail for any updates.     On the morning of surgery:   Wear comfortable, loose  fitting clothes which open in the front. Please do not wear moisturizers, creams, lotions, makeup or perfume.    Please bring with you to surgery:   Photo ID and insurance card   Current list of medicines and allergies   Pacemaker/ Defibrillator/Heart stent cards   CPAP machine and mask    Slings/ splints/ crutches   A copy of your complete advanced directive/DHPOA.    Please do NOT bring with you to surgery:   All jewelry and valuables should be left at home.   Prosthetic devices such as contact lenses, hearing aids, dentures, eyelash extensions, hairpins and body piercings must be removed prior to going in to the surgical suite.    After outpatient surgery:   A responsible adult MUST accompany you at the time of discharge and stay with you for 24 hours after your surgery. You may NOT drive yourself home after surgery.    Do not drive, operate machinery, make critical decisions or do activities that require co-ordination or balance until after a night’s sleep.   Do not drink alcoholic beverages for 24 hours.   Instructions for resuming your medications will be provided by your surgeon.    CALL YOUR DOCTOR AFTER SURGERY IF YOU HAVE:     Chills and/or a fever of 101° F or higher.    Redness, swelling, pus or drainage from your surgical wound or a bad smell from the wound.    Lightheadedness, fainting or confusion.    Persistent vomiting (throwing up) and are not able to eat or drink for 12 hours.    Three or more loose, watery bowel movements in 24 hours (diarrhea).   Difficulty or pain while urinating( after non-urological surgery)    Pain and swelling in your legs, especially if it is only on one side.    Difficulty breathing or are breathing faster than normal.    Any new concerning symptoms.

## 2025-02-26 NOTE — H&P (VIEW-ONLY)
CPM/PAT Evaluation       Name: Hansa Mix (Hansa Mix)  /Age: 1985/39 y.o.     In-Person       Chief Complaint: Anal fissure    HPI: Hansa Mix is a 39 year old female with a history of an anal fissure. She had a sphincterotomy 7 -8 years ago. She was doing well until last October when she was having anal pain and blood in the stool. She was found to have a posterior anal fissure. She had botox injections 24. She states her symptoms were getting better for 3 weeks and then after her follow up she started having burning and and stabbing pain in her anus. She states she still has bleeding but denies pus. She states she has pain and burning after bowel movements. She uses sitz baths to help with pain. She is scheduled for a repeat sphincterotomy . She denies fever, chills, chest pain, sob, dizziness, and palpitations.    Past Medical History:   Diagnosis Date    Depression     Diabetes mellitus (Multi) 2024    Diverticulitis of colon     Fissure, anal     Rectal bleeding        Past Surgical History:   Procedure Laterality Date    ANUS SURGERY N/A 2024    fissurectomy- Botox Injection performed by Dr. Blakely    APPENDECTOMY         Social History     Tobacco Use    Smoking status: Former     Current packs/day: 0.00     Average packs/day: 1 pack/day for 20.0 years (20.0 ttl pk-yrs)     Types: Cigarettes     Start date: 2004     Quit date: 2024     Years since quittin.8    Smokeless tobacco: Never   Substance Use Topics    Alcohol use: Not Currently     Alcohol/week: 1.0 standard drink of alcohol     Types: 1 Glasses of wine per week     Comment: socially     Social History     Substance and Sexual Activity   Drug Use Not Currently    Types: Marijuana    Comment: GUMMIES - last used over 2 years ago         Family History   Problem Relation Name Age of Onset    Depression Mother chio     Depression Father remedios     Diabetes Father remedios     Hearing  loss Father remedios     Heart disease Father remedios     Hypertension Father remedios     Skin cancer Maternal Grandmother      Breast cancer Maternal Grandmother      Colon cancer Maternal Grandfather Jluis        No Known Allergies  Current Outpatient Medications   Medication Sig Dispense Refill    nitroglycerin in white petrolatum ointment Apply topically to affected area 2 times a day. 30 g 1    ondansetron (Zofran) 4 mg tablet Take 1 tablet (4 mg) by mouth every 8 hours if needed for nausea or vomiting. 20 tablet 1    polyethylene glycol (Glycolax, Miralax) 17 gram packet Take 17 g by mouth every other day.      acetaminophen (Tylenol) 500 mg tablet Take 2 tablets (1,000 mg) by mouth every 6 hours if needed for mild pain (1 - 3).      tirzepatide (Mounjaro) 15 mg/0.5 mL pen injector Inject 15 mg under the skin 1 (one) time per week. (Patient taking differently: Inject 15 mg under the skin 1 (one) time per week. Friday) 2 mL 3     No current facility-administered medications for this visit.       Review of Systems   Constitutional: Negative.    HENT: Negative.     Eyes: Negative.    Respiratory: Negative.     Cardiovascular: Negative.    Gastrointestinal:  Positive for anal bleeding.        Anal pain     Endocrine: Negative.    Genitourinary: Negative.    Musculoskeletal: Negative.    Skin: Negative.    Allergic/Immunologic: Negative.    Neurological: Negative.    Hematological: Negative.    Psychiatric/Behavioral: Negative.                Physical Exam  Vitals reviewed.   Constitutional:       Appearance: Normal appearance.   HENT:      Head: Normocephalic and atraumatic.      Nose: Nose normal.      Mouth/Throat:      Mouth: Mucous membranes are moist.      Pharynx: Oropharynx is clear.   Eyes:      Extraocular Movements: Extraocular movements intact.      Conjunctiva/sclera: Conjunctivae normal.   Cardiovascular:      Rate and Rhythm: Normal rate and regular rhythm.      Pulses: Normal pulses.      Heart sounds: Normal  heart sounds.   Pulmonary:      Effort: Pulmonary effort is normal.      Breath sounds: Normal breath sounds.   Abdominal:      General: Bowel sounds are normal.      Palpations: Abdomen is soft.   Genitourinary:     Comments: Assessment deferred to physician      Musculoskeletal:         General: Normal range of motion.      Cervical back: Normal range of motion and neck supple.   Skin:     General: Skin is warm and dry.   Neurological:      General: No focal deficit present.      Mental Status: She is alert and oriented to person, place, and time.   Psychiatric:         Mood and Affect: Mood normal.         Behavior: Behavior normal.         Thought Content: Thought content normal.         Judgment: Judgment normal.          PAT AIRWAY:   Airway:     Mallampati::  II    TM distance::  >3 FB    Neck ROM::  Full  normal            Visit Vitals  /77   Pulse 77   Temp 36.2 °C (97.2 °F) (Temporal)   Resp 18   LMP 2025 (Approximate)   OB Status Having periods   Smoking Status Former     ASA: II  CHADS2: 2.8%  RCRI: 0.4%  DASI:58.2  METS:9.9  STOP BAN        Assessment and Plan:     Anal fissure, : SPHINCTEROTOMY, ANAL   Diabetes type 2: Mounjaro  Depression  Diverticulitis  BMI: 36.02    EKG 24  LABS: CBC, BMP ordered in PAT  Pretty Quijano APRN-CNP

## 2025-03-05 ENCOUNTER — ANESTHESIA (OUTPATIENT)
Dept: OPERATING ROOM | Facility: HOSPITAL | Age: 40
End: 2025-03-05
Payer: COMMERCIAL

## 2025-03-05 ENCOUNTER — PHARMACY VISIT (OUTPATIENT)
Dept: PHARMACY | Facility: CLINIC | Age: 40
End: 2025-03-05
Payer: COMMERCIAL

## 2025-03-05 ENCOUNTER — ANESTHESIA EVENT (OUTPATIENT)
Dept: OPERATING ROOM | Facility: HOSPITAL | Age: 40
End: 2025-03-05
Payer: COMMERCIAL

## 2025-03-05 ENCOUNTER — HOSPITAL ENCOUNTER (OUTPATIENT)
Facility: HOSPITAL | Age: 40
Setting detail: OUTPATIENT SURGERY
Discharge: HOME | End: 2025-03-05
Attending: STUDENT IN AN ORGANIZED HEALTH CARE EDUCATION/TRAINING PROGRAM | Admitting: STUDENT IN AN ORGANIZED HEALTH CARE EDUCATION/TRAINING PROGRAM
Payer: COMMERCIAL

## 2025-03-05 VITALS
HEART RATE: 74 BPM | BODY MASS INDEX: 36.1 KG/M2 | HEIGHT: 67 IN | DIASTOLIC BLOOD PRESSURE: 60 MMHG | WEIGHT: 230 LBS | OXYGEN SATURATION: 98 % | RESPIRATION RATE: 18 BRPM | TEMPERATURE: 96.8 F | SYSTOLIC BLOOD PRESSURE: 113 MMHG

## 2025-03-05 DIAGNOSIS — K60.2 ANAL FISSURE, UNSPECIFIED: Primary | ICD-10-CM

## 2025-03-05 LAB
GLUCOSE BLD MANUAL STRIP-MCNC: 84 MG/DL (ref 74–99)
PREGNANCY TEST URINE, POC: NEGATIVE

## 2025-03-05 PROCEDURE — RXMED WILLOW AMBULATORY MEDICATION CHARGE

## 2025-03-05 PROCEDURE — 3700000001 HC GENERAL ANESTHESIA TIME - INITIAL BASE CHARGE: Performed by: STUDENT IN AN ORGANIZED HEALTH CARE EDUCATION/TRAINING PROGRAM

## 2025-03-05 PROCEDURE — 2500000004 HC RX 250 GENERAL PHARMACY W/ HCPCS (ALT 636 FOR OP/ED): Performed by: NURSE ANESTHETIST, CERTIFIED REGISTERED

## 2025-03-05 PROCEDURE — A46200 PR REMOVAL OF ANAL FISSURE: Performed by: NURSE ANESTHETIST, CERTIFIED REGISTERED

## 2025-03-05 PROCEDURE — 7100000009 HC PHASE TWO TIME - INITIAL BASE CHARGE: Performed by: STUDENT IN AN ORGANIZED HEALTH CARE EDUCATION/TRAINING PROGRAM

## 2025-03-05 PROCEDURE — 7100000001 HC RECOVERY ROOM TIME - INITIAL BASE CHARGE: Performed by: STUDENT IN AN ORGANIZED HEALTH CARE EDUCATION/TRAINING PROGRAM

## 2025-03-05 PROCEDURE — 3600000003 HC OR TIME - INITIAL BASE CHARGE - PROCEDURE LEVEL THREE: Performed by: STUDENT IN AN ORGANIZED HEALTH CARE EDUCATION/TRAINING PROGRAM

## 2025-03-05 PROCEDURE — 81025 URINE PREGNANCY TEST: CPT | Performed by: STUDENT IN AN ORGANIZED HEALTH CARE EDUCATION/TRAINING PROGRAM

## 2025-03-05 PROCEDURE — A46200 PR REMOVAL OF ANAL FISSURE: Performed by: ANESTHESIOLOGY

## 2025-03-05 PROCEDURE — 2500000004 HC RX 250 GENERAL PHARMACY W/ HCPCS (ALT 636 FOR OP/ED): Performed by: STUDENT IN AN ORGANIZED HEALTH CARE EDUCATION/TRAINING PROGRAM

## 2025-03-05 PROCEDURE — 7100000010 HC PHASE TWO TIME - EACH INCREMENTAL 1 MINUTE: Performed by: STUDENT IN AN ORGANIZED HEALTH CARE EDUCATION/TRAINING PROGRAM

## 2025-03-05 PROCEDURE — 2500000001 HC RX 250 WO HCPCS SELF ADMINISTERED DRUGS (ALT 637 FOR MEDICARE OP): Performed by: ANESTHESIOLOGY

## 2025-03-05 PROCEDURE — 2500000004 HC RX 250 GENERAL PHARMACY W/ HCPCS (ALT 636 FOR OP/ED): Mod: JZ | Performed by: ANESTHESIOLOGY

## 2025-03-05 PROCEDURE — 7100000002 HC RECOVERY ROOM TIME - EACH INCREMENTAL 1 MINUTE: Performed by: STUDENT IN AN ORGANIZED HEALTH CARE EDUCATION/TRAINING PROGRAM

## 2025-03-05 PROCEDURE — 3600000008 HC OR TIME - EACH INCREMENTAL 1 MINUTE - PROCEDURE LEVEL THREE: Performed by: STUDENT IN AN ORGANIZED HEALTH CARE EDUCATION/TRAINING PROGRAM

## 2025-03-05 PROCEDURE — 46200 REMOVAL OF ANAL FISSURE: CPT | Performed by: STUDENT IN AN ORGANIZED HEALTH CARE EDUCATION/TRAINING PROGRAM

## 2025-03-05 PROCEDURE — 82947 ASSAY GLUCOSE BLOOD QUANT: CPT

## 2025-03-05 PROCEDURE — 3700000002 HC GENERAL ANESTHESIA TIME - EACH INCREMENTAL 1 MINUTE: Performed by: STUDENT IN AN ORGANIZED HEALTH CARE EDUCATION/TRAINING PROGRAM

## 2025-03-05 RX ORDER — LIDOCAINE HYDROCHLORIDE AND EPINEPHRINE 10; 10 UG/ML; MG/ML
INJECTION, SOLUTION INFILTRATION; PERINEURAL AS NEEDED
Status: DISCONTINUED | OUTPATIENT
Start: 2025-03-05 | End: 2025-03-05 | Stop reason: HOSPADM

## 2025-03-05 RX ORDER — TRAMADOL HYDROCHLORIDE 50 MG/1
50 TABLET ORAL EVERY 6 HOURS PRN
Qty: 15 TABLET | Refills: 0 | Status: SHIPPED | OUTPATIENT
Start: 2025-03-05

## 2025-03-05 RX ORDER — LABETALOL HYDROCHLORIDE 5 MG/ML
5 INJECTION, SOLUTION INTRAVENOUS ONCE AS NEEDED
Status: DISCONTINUED | OUTPATIENT
Start: 2025-03-05 | End: 2025-03-05 | Stop reason: HOSPADM

## 2025-03-05 RX ORDER — PROPOFOL 10 MG/ML
INJECTION, EMULSION INTRAVENOUS AS NEEDED
Status: DISCONTINUED | OUTPATIENT
Start: 2025-03-05 | End: 2025-03-05

## 2025-03-05 RX ORDER — LIDOCAINE HYDROCHLORIDE 20 MG/ML
INJECTION, SOLUTION INFILTRATION; PERINEURAL AS NEEDED
Status: DISCONTINUED | OUTPATIENT
Start: 2025-03-05 | End: 2025-03-05

## 2025-03-05 RX ORDER — ALBUTEROL SULFATE 0.83 MG/ML
2.5 SOLUTION RESPIRATORY (INHALATION) ONCE AS NEEDED
Status: DISCONTINUED | OUTPATIENT
Start: 2025-03-05 | End: 2025-03-05 | Stop reason: HOSPADM

## 2025-03-05 RX ORDER — FENTANYL CITRATE 50 UG/ML
50 INJECTION, SOLUTION INTRAMUSCULAR; INTRAVENOUS EVERY 5 MIN PRN
Status: DISCONTINUED | OUTPATIENT
Start: 2025-03-05 | End: 2025-03-05 | Stop reason: HOSPADM

## 2025-03-05 RX ORDER — TIZANIDINE 2 MG/1
4 TABLET ORAL EVERY 6 HOURS PRN
Qty: 30 TABLET | Refills: 0 | Status: SHIPPED | OUTPATIENT
Start: 2025-03-05

## 2025-03-05 RX ORDER — MEPERIDINE HYDROCHLORIDE 25 MG/ML
12.5 INJECTION INTRAMUSCULAR; INTRAVENOUS; SUBCUTANEOUS EVERY 10 MIN PRN
Status: DISCONTINUED | OUTPATIENT
Start: 2025-03-05 | End: 2025-03-05 | Stop reason: HOSPADM

## 2025-03-05 RX ORDER — SODIUM CHLORIDE, SODIUM LACTATE, POTASSIUM CHLORIDE, CALCIUM CHLORIDE 600; 310; 30; 20 MG/100ML; MG/100ML; MG/100ML; MG/100ML
100 INJECTION, SOLUTION INTRAVENOUS CONTINUOUS
Status: DISCONTINUED | OUTPATIENT
Start: 2025-03-05 | End: 2025-03-05 | Stop reason: HOSPADM

## 2025-03-05 RX ORDER — OXYCODONE HYDROCHLORIDE 5 MG/1
5 TABLET ORAL ONCE AS NEEDED
Status: COMPLETED | OUTPATIENT
Start: 2025-03-05 | End: 2025-03-05

## 2025-03-05 RX ORDER — ONDANSETRON HYDROCHLORIDE 2 MG/ML
4 INJECTION, SOLUTION INTRAVENOUS ONCE AS NEEDED
Status: DISCONTINUED | OUTPATIENT
Start: 2025-03-05 | End: 2025-03-05 | Stop reason: HOSPADM

## 2025-03-05 RX ORDER — MIDAZOLAM HYDROCHLORIDE 1 MG/ML
INJECTION, SOLUTION INTRAMUSCULAR; INTRAVENOUS AS NEEDED
Status: DISCONTINUED | OUTPATIENT
Start: 2025-03-05 | End: 2025-03-05

## 2025-03-05 RX ORDER — HYDRALAZINE HYDROCHLORIDE 20 MG/ML
5 INJECTION INTRAMUSCULAR; INTRAVENOUS EVERY 30 MIN PRN
Status: DISCONTINUED | OUTPATIENT
Start: 2025-03-05 | End: 2025-03-05 | Stop reason: HOSPADM

## 2025-03-05 RX ADMIN — PROPOFOL 30 MG: 10 INJECTION, EMULSION INTRAVENOUS at 15:29

## 2025-03-05 RX ADMIN — MIDAZOLAM 2 MG: 1 INJECTION INTRAMUSCULAR; INTRAVENOUS at 15:16

## 2025-03-05 RX ADMIN — FENTANYL CITRATE 50 MCG: 0.05 INJECTION, SOLUTION INTRAMUSCULAR; INTRAVENOUS at 15:40

## 2025-03-05 RX ADMIN — HYDROMORPHONE HYDROCHLORIDE 0.5 MG: 1 INJECTION, SOLUTION INTRAMUSCULAR; INTRAVENOUS; SUBCUTANEOUS at 15:48

## 2025-03-05 RX ADMIN — LIDOCAINE HYDROCHLORIDE 20 MG: 20 INJECTION, SOLUTION INFILTRATION; PERINEURAL at 15:15

## 2025-03-05 RX ADMIN — PROPOFOL 30 MG: 10 INJECTION, EMULSION INTRAVENOUS at 15:24

## 2025-03-05 RX ADMIN — PROPOFOL 20 MG: 10 INJECTION, EMULSION INTRAVENOUS at 15:16

## 2025-03-05 RX ADMIN — MIDAZOLAM 1 MG: 1 INJECTION INTRAMUSCULAR; INTRAVENOUS at 15:20

## 2025-03-05 RX ADMIN — HYDROMORPHONE HYDROCHLORIDE 0.5 MG: 1 INJECTION, SOLUTION INTRAMUSCULAR; INTRAVENOUS; SUBCUTANEOUS at 15:52

## 2025-03-05 RX ADMIN — MIDAZOLAM 2 MG: 1 INJECTION INTRAMUSCULAR; INTRAVENOUS at 15:14

## 2025-03-05 RX ADMIN — MIDAZOLAM 2 MG: 1 INJECTION INTRAMUSCULAR; INTRAVENOUS at 15:23

## 2025-03-05 RX ADMIN — PROPOFOL 30 MG: 10 INJECTION, EMULSION INTRAVENOUS at 15:27

## 2025-03-05 RX ADMIN — PROPOFOL 30 MG: 10 INJECTION, EMULSION INTRAVENOUS at 15:25

## 2025-03-05 RX ADMIN — OXYCODONE 5 MG: 5 TABLET ORAL at 16:15

## 2025-03-05 RX ADMIN — HYDROMORPHONE HYDROCHLORIDE 0.5 MG: 1 INJECTION, SOLUTION INTRAMUSCULAR; INTRAVENOUS; SUBCUTANEOUS at 16:02

## 2025-03-05 RX ADMIN — PROPOFOL 30 MG: 10 INJECTION, EMULSION INTRAVENOUS at 15:31

## 2025-03-05 RX ADMIN — FENTANYL CITRATE 50 MCG: 0.05 INJECTION, SOLUTION INTRAMUSCULAR; INTRAVENOUS at 15:44

## 2025-03-05 RX ADMIN — MIDAZOLAM 2 MG: 1 INJECTION INTRAMUSCULAR; INTRAVENOUS at 15:12

## 2025-03-05 RX ADMIN — SODIUM CHLORIDE, POTASSIUM CHLORIDE, SODIUM LACTATE AND CALCIUM CHLORIDE: 600; 310; 30; 20 INJECTION, SOLUTION INTRAVENOUS at 15:11

## 2025-03-05 RX ADMIN — PROPOFOL 30 MG: 10 INJECTION, EMULSION INTRAVENOUS at 15:23

## 2025-03-05 RX ADMIN — MIDAZOLAM 1 MG: 1 INJECTION INTRAMUSCULAR; INTRAVENOUS at 15:18

## 2025-03-05 SDOH — HEALTH STABILITY: MENTAL HEALTH: CURRENT SMOKER: 0

## 2025-03-05 ASSESSMENT — PAIN SCALES - GENERAL
PAINLEVEL_OUTOF10: 5 - MODERATE PAIN
PAINLEVEL_OUTOF10: 1
PAINLEVEL_OUTOF10: 10 - WORST POSSIBLE PAIN
PAINLEVEL_OUTOF10: 5 - MODERATE PAIN
PAINLEVEL_OUTOF10: 3
PAINLEVEL_OUTOF10: 5 - MODERATE PAIN
PAIN_LEVEL: 1
PAINLEVEL_OUTOF10: 5 - MODERATE PAIN

## 2025-03-05 ASSESSMENT — PAIN DESCRIPTION - LOCATION
LOCATION: RECTUM

## 2025-03-05 ASSESSMENT — PAIN - FUNCTIONAL ASSESSMENT
PAIN_FUNCTIONAL_ASSESSMENT: 0-10

## 2025-03-05 ASSESSMENT — PAIN DESCRIPTION - DESCRIPTORS: DESCRIPTORS: SHARP;STABBING

## 2025-03-05 NOTE — ANESTHESIA PREPROCEDURE EVALUATION
Patient: Hansa Mix    Procedure Information       Date/Time: 03/05/25 1345    Procedure: SPHINCTEROTOMY, ANAL    Location: [facility] / Virtual [facility]    Surgeons: Jessica Blakely MD            Relevant Problems   Neuro   (+) Anxiety   (+) Depression, major, recurrent (CMS-HCC)   (+) ZACH (generalized anxiety disorder)   (+) MDD (major depressive disorder), recurrent episode, moderate   (+) PTSD (post-traumatic stress disorder)      Endocrine   (+) Type 2 diabetes mellitus without complication, without long-term current use of insulin (Multi)      ID   (+) Hidradenitis suppurativa       Clinical information reviewed:   Tobacco  Allergies  Meds   Med Hx  Surg Hx  OB Status  Fam Hx  Soc   Hx        NPO Detail:  NPO/Void Status  Carbohydrate Drink Given Prior to Surgery? : N  Date of Last Liquid: 03/04/25  Time of Last Liquid: 2130  Date of Last Solid: 03/04/25  Time of Last Solid: 2130  Last Intake Type: Clear fluids  Time of Last Void: 1229         Physical Exam    Airway  Mallampati: II  TM distance: >3 FB  Neck ROM: full     Cardiovascular   Comments: deferred   Dental    Pulmonary   Comments: deferred   Abdominal     Comments: deferred           Anesthesia Plan    History of general anesthesia?: yes  History of complications of general anesthesia?: no    ASA 3     general     The patient is not a current smoker. (quit cigarettes in 2024)  Education provided regarding risk of obstructive sleep apnea.  intravenous induction   Postoperative administration of opioids is intended.  Anesthetic plan and risks discussed with patient.    Plan discussed with CRNA.

## 2025-03-05 NOTE — OP NOTE
EXCISION, FISSURE OR FISTULA, ANUS OR RECTUM Operative Note     Date: 3/5/2025  OR Location: [facility]    Name: Hansa Mix, : 1985, Age: 39 y.o., MRN: [id number], Sex: female    Diagnosis  Pre-op Diagnosis      * Anal fissure, unspecified [K60.2] Post-op Diagnosis     * Anal fissure, unspecified [K60.2]     Procedures  EXCISION, FISSURE OR FISTULA, ANUS OR RECTUM  [address] - SD FISSURECTOMY INCL SPHINCTEROTOMY WHEN PERFORMED      Surgeons      * Jessica Blakely - Primary    Resident/Fellow/Other Assistant:  Surgeons and Role:  * No surgeons found with a matching role *    Staff:   Circulator: Airam Hammonds Person: Randy  Surgical Assistant: Antwon Go Circulator: Maya    Anesthesia Staff: Anesthesiologist: Efrem Almonte MD  CRNA: LÁZARO Syed-CRNA    Procedure Summary  Anesthesia: General  ASA: III  Estimated Blood Loss: 5mL  Intra-op Medications: Administrations occurring from 1345 to 1445 on 25:  * No intraprocedure medications in log *           Anesthesia Record               Intraprocedure I/O Totals          Intake    LR bolus 250.00 mL    Total Intake 250 mL          Specimen: No specimens collected       Findings: Posterior midline anal fissure, hypertonic anal sphincter    Indications: Hansa Mix is an 39 y.o. female who is having surgery for Anal fissure, unspecified [K60.2].     The patient was seen in the preoperative area. The risks, benefits, complications, treatment options, non-operative alternatives, expected recovery and outcomes were discussed with the patient. The possibilities of reaction to medication, pulmonary aspiration, injury to surrounding structures, bleeding, recurrent infection, the need for additional procedures, failure to diagnose a condition, and creating a complication requiring transfusion or operation were discussed with the patient. The patient concurred with the proposed plan, giving informed consent.  The site of surgery was properly  noted/marked if necessary per policy. The patient has been actively warmed in preoperative area. Preoperative antibiotics have been ordered and given within 1 hours of incision. Venous thrombosis prophylaxis have been ordered including bilateral sequential compression devices    Procedure Details:   The patient was brought to the operating room placed on the operative table in the prone position.  MAC anesthesia was induced.  Her gluteus were taped apart and she was prepped and draped.  A bilateral pudendal and perianal nerve block was performed.  On digital rectal exam she had a hypertonic internal anal sphincter.  A medium Hill-Balderas was placed into the anal canal and there is evidence of a nonhealing posterior midline anal fissure.  In the left lateral position I made an incision in the intersphincteric groove.  I dissected down and elevated the internal anal sphincter muscle.  The muscle was transected along the length of the fissure.  Once this was complete the sphincter appeared much more relaxed.  Hemostasis was ensured.  I closed the incision with a buried Vicryl stitch.  I then performed a fissurectomy of the fissure.  There was some heaped up scar tissue along the edges which was bluntly debrided with a Ray-Darcy and Bovie electrocautery.  There was some bleeding from the midline which was also bovied.  This concluded the procedure.  She was brought back to the stretcher in the supine position and taken to PACU in stable condition.  Complications:  None; patient tolerated the procedure well.    Disposition: PACU - hemodynamically stable.  Condition: stable         Jessica Blakely  Phone Number: [phone]

## 2025-03-05 NOTE — DISCHARGE INSTRUCTIONS
What should I expect during my recovery?     Bleeding. Some bleeding is normal, especially after having a bowel movement (pooping). You might see blood in your stool or on your toilet paper. You might also see some in your underwear, along with some clear or yellow discharge. You can wear a sanitary pad or gauze to help soak up the fluid. This can last for up to a month. It shouldn’t be a lot of blood. It’s possible to split the wound open if you strain too hard to poop, which could cause more severe bleeding. It’s important to avoid constipation to prevent this. Rarely, some people experience a postoperative hemorrhage, acute bleeding from the blood vessels that were cut in surgery. If you bleed a lot, call your provider.     Swelling. Swelling is normal. Swelling inside your anus can feel similar to hemorrhoids or like you need to have a bowel movement, but this isn’t the case. Sometimes a lump or a skin tag will develop at the site of the wound, which can also be deceiving. These generally go away once you’ve healed. Healing time varies for everyone. You can help reduce the swelling by applying ice wrapped in a towel to the wound a few times a day for 10 minutes at a time. It also helps to lie on your stomach with a pillow supporting your hips. See if you can sleep this way.     Pain. You can expect some pain , but you should be able to manage your pain with medications. To get ahead of the pain after your operation, start your medications before you start to feel it. Take the tylenol and muscle relaxer and if needed tramadol. Typically, the most significant pain occurs with your first bowel movement. Pain usually improves after three days and continues to improve for the next two weeks.     Pooping.  Gradually add fiber to your diet. Try eating high-fiber foods or take fiber supplements.  Avoid spicy foods. Spices can burn on the way out.  Drink plenty of water. Hydration is essential, especially as you add more  fiber to your diet.  Try unfiltered apple juice or prune juice. These have natural laxative properties.  Use laxatives as needed. Miralax is best and can be used more or less as needed.  Don’t sit on the toilet for more than 5 minutes. If nothing happens, get up and try again later.   Use gentle wipes. Baby wipes or medicated pads can help protect your tender tissues.  Use topical anesthetic. Apply lidocaine cream to help numb your wound before pooping.     Healing.  Healing takes time, but you can help the process along by taking good care of your wound and your body during your recovery.     Sitz baths. Soak your bottom in a tub or shower with warm water a few times a day.  Ice. Apply an ice pack wrapped in a towel to your wound for 10 minutes a few times a day.  Medications. Your provider will suggest a combination of oral and topical pain medications.  Rest. Don’t push yourself too hard to go back to your usual activities. Give yourself time to heal.  Preventing constipation. Use every trick in the book if necessary.     When To Call the Doctor  Fever.  Excessive bleeding.  Excessive pain.  Your wound is red and hot to touch.  Difficulty peeing.  Difficulty pooping.

## 2025-03-05 NOTE — ANESTHESIA POSTPROCEDURE EVALUATION
Patient: Hansa Mix    Procedure Summary       Date: 03/05/25 Room / Location: [facility] OR 10 / Virtual [facility] OR    Anesthesia Start: 1511 Anesthesia Stop: 1542    Procedure: EXCISION, FISSURE OR FISTULA, ANUS OR RECTUM (Anus) Diagnosis:       Anal fissure, unspecified      (Anal fissure, unspecified [K60.2])    Surgeons: Jessica Blakely MD Responsible Provider: Regis Sainz MD    Anesthesia Type: general ASA Status: 3            Anesthesia Type: general    Vitals Value Taken Time   /94 03/05/25 1615   Temp 35.8 °C (96.4 °F) 03/05/25 1540   Pulse 84 03/05/25 1615   Resp 24 03/05/25 1615   SpO2 98 % 03/05/25 1615       Anesthesia Post Evaluation    Patient location during evaluation: PACU  Patient participation: complete - patient participated  Level of consciousness: awake and alert  Pain score: 1  Pain management: adequate  Multimodal analgesia pain management approach  Airway patency: patent  Two or more strategies used to mitigate risk of obstructive sleep apnea  Cardiovascular status: acceptable and blood pressure returned to baseline  Respiratory status: acceptable  Hydration status: acceptable  Postoperative Nausea and Vomiting: none        There were no known notable events for this encounter.

## 2025-03-10 ENCOUNTER — PHARMACY VISIT (OUTPATIENT)
Dept: PHARMACY | Facility: CLINIC | Age: 40
End: 2025-03-10
Payer: COMMERCIAL

## 2025-03-10 DIAGNOSIS — R11.0 NAUSEA: Primary | ICD-10-CM

## 2025-03-10 PROCEDURE — RXMED WILLOW AMBULATORY MEDICATION CHARGE

## 2025-03-10 RX ORDER — ONDANSETRON 4 MG/1
4 TABLET, ORALLY DISINTEGRATING ORAL EVERY 8 HOURS PRN
Qty: 20 TABLET | Refills: 2 | Status: SHIPPED | OUTPATIENT
Start: 2025-03-10 | End: 2025-03-30

## 2025-03-13 ENCOUNTER — PHARMACY VISIT (OUTPATIENT)
Dept: PHARMACY | Facility: CLINIC | Age: 40
End: 2025-03-13
Payer: COMMERCIAL

## 2025-03-13 PROCEDURE — RXMED WILLOW AMBULATORY MEDICATION CHARGE

## 2025-04-04 ENCOUNTER — OFFICE VISIT (OUTPATIENT)
Dept: SURGERY | Facility: CLINIC | Age: 40
End: 2025-04-04
Payer: COMMERCIAL

## 2025-04-04 DIAGNOSIS — Z09 POSTOPERATIVE EXAMINATION: Primary | ICD-10-CM

## 2025-04-04 PROCEDURE — 99211 OFF/OP EST MAY X REQ PHY/QHP: CPT | Performed by: STUDENT IN AN ORGANIZED HEALTH CARE EDUCATION/TRAINING PROGRAM

## 2025-04-04 NOTE — PROGRESS NOTES
Subjective   Patient ID: Hansa Mix is a 39 y.o. female who presents for No chief complaint on file..  S/p sphincterotomy for anal fissure after persistent fissure with nitrobid and botox injection. She reports she is feeling much better. Occasionally has a twinge of pain. Has some fecal urgency and occasionally noticed stool after wiping.         Review of Systems    Objective   Physical Exam  Neurological:      Mental Status: She is alert.         Assessment/Plan   Problem List Items Addressed This Visit    None  Visit Diagnoses         Codes    Postoperative examination    -  Primary Z09        Doing well after sphincterotomy. Pain resolved. Occasional external pain with hygiene. Can use calmoseptine as needed for barrier protection and itch/pain control. Fiber to thicken stool and imodium prn.  All questions answered. Visit had to be made a phone visit due to surgical emergency but she can make an inperson visit for evaluation of perineum if it does not improve.         Jessica Blakely MD 04/04/25

## 2025-04-10 ENCOUNTER — PHARMACY VISIT (OUTPATIENT)
Dept: PHARMACY | Facility: CLINIC | Age: 40
End: 2025-04-10
Payer: COMMERCIAL

## 2025-04-10 PROCEDURE — RXMED WILLOW AMBULATORY MEDICATION CHARGE

## 2025-04-10 ASSESSMENT — ENCOUNTER SYMPTOMS
HUNGER: 0
SWEATS: 0
WEAKNESS: 0
BLACKOUTS: 0
NERVOUS/ANXIOUS: 0
POLYPHAGIA: 0
DIZZINESS: 0
VISUAL CHANGE: 0
SEIZURES: 0
HEADACHES: 0
POLYDIPSIA: 1
WEIGHT LOSS: 1
SPEECH DIFFICULTY: 0
TREMORS: 0
FATIGUE: 0
CONFUSION: 0
BLURRED VISION: 0

## 2025-04-15 ENCOUNTER — OFFICE VISIT (OUTPATIENT)
Dept: PRIMARY CARE | Facility: CLINIC | Age: 40
End: 2025-04-15
Payer: COMMERCIAL

## 2025-04-15 VITALS
BODY MASS INDEX: 33.89 KG/M2 | DIASTOLIC BLOOD PRESSURE: 80 MMHG | SYSTOLIC BLOOD PRESSURE: 118 MMHG | WEIGHT: 216.4 LBS | OXYGEN SATURATION: 99 % | HEART RATE: 71 BPM | RESPIRATION RATE: 18 BRPM | TEMPERATURE: 97.7 F

## 2025-04-15 DIAGNOSIS — E11.9 TYPE 2 DIABETES MELLITUS WITHOUT COMPLICATION, WITHOUT LONG-TERM CURRENT USE OF INSULIN: ICD-10-CM

## 2025-04-15 LAB — POC HEMOGLOBIN A1C: 5.1 % (ref 4.2–6.5)

## 2025-04-15 PROCEDURE — 83036 HEMOGLOBIN GLYCOSYLATED A1C: CPT | Performed by: FAMILY MEDICINE

## 2025-04-15 PROCEDURE — 99213 OFFICE O/P EST LOW 20 MIN: CPT | Performed by: FAMILY MEDICINE

## 2025-04-15 PROCEDURE — 3044F HG A1C LEVEL LT 7.0%: CPT | Performed by: FAMILY MEDICINE

## 2025-04-15 PROCEDURE — 3074F SYST BP LT 130 MM HG: CPT | Performed by: FAMILY MEDICINE

## 2025-04-15 PROCEDURE — 3079F DIAST BP 80-89 MM HG: CPT | Performed by: FAMILY MEDICINE

## 2025-04-15 PROCEDURE — 1036F TOBACCO NON-USER: CPT | Performed by: FAMILY MEDICINE

## 2025-04-15 RX ORDER — TIRZEPATIDE 15 MG/.5ML
15 INJECTION, SOLUTION SUBCUTANEOUS WEEKLY
Qty: 2 ML | Refills: 3 | Status: SHIPPED | OUTPATIENT
Start: 2025-04-15

## 2025-04-15 ASSESSMENT — ENCOUNTER SYMPTOMS
DEPRESSION: 0
SEIZURES: 0
WEAKNESS: 0
BLURRED VISION: 0
OCCASIONAL FEELINGS OF UNSTEADINESS: 0
BLACKOUTS: 0
CONFUSION: 0
HEADACHES: 0
SWEATS: 0
FATIGUE: 0
VISUAL CHANGE: 0
DIZZINESS: 0
NERVOUS/ANXIOUS: 0
TREMORS: 0
POLYPHAGIA: 0
SPEECH DIFFICULTY: 0
POLYDIPSIA: 1
LOSS OF SENSATION IN FEET: 0
HUNGER: 0
WEIGHT LOSS: 1

## 2025-04-15 ASSESSMENT — PAIN SCALES - GENERAL: PAINLEVEL_OUTOF10: 0-NO PAIN

## 2025-04-15 ASSESSMENT — PATIENT HEALTH QUESTIONNAIRE - PHQ9
SUM OF ALL RESPONSES TO PHQ9 QUESTIONS 1 AND 2: 0
2. FEELING DOWN, DEPRESSED OR HOPELESS: NOT AT ALL
1. LITTLE INTEREST OR PLEASURE IN DOING THINGS: NOT AT ALL

## 2025-04-15 NOTE — PROGRESS NOTES
Subjective   Patient ID: Hansa Mix is a 39 y.o. female who presents for Diabetes.    Diabetes  She has type 2 diabetes mellitus. No MedicAlert identification noted. The initial diagnosis of diabetes was made 9 months ago. Pertinent negatives for hypoglycemia include no confusion, dizziness, headaches, hunger, mood changes, nervousness/anxiousness, pallor, seizures, sleepiness, speech difficulty, sweats or tremors. Associated symptoms include polydipsia and weight loss. Pertinent negatives for diabetes include no blurred vision, no chest pain, no fatigue, no foot paresthesias, no foot ulcerations, no polyphagia, no polyuria, no visual change and no weakness. Pertinent negatives for hypoglycemia complications include no blackouts, no hospitalization, no nocturnal hypoglycemia, no required assistance and no required glucagon injection. Symptoms are stable. Pertinent negatives for diabetic complications include no CVA, heart disease, impotence, nephropathy, peripheral neuropathy, PVD or retinopathy. Risk factors for coronary artery disease include family history, obesity, stress and tobacco exposure. Current diabetic treatment includes diet and oral agent (monotherapy). She is compliant with treatment most of the time. Her weight is decreasing steadily. She is following a generally healthy diet. When asked about meal planning, she reported none. She has not had a previous visit with a dietitian. She participates in exercise weekly. There is no compliance with monitoring of blood glucose. She does not see a podiatrist.Eye exam is not current.        Review of Systems   Constitutional:  Positive for weight loss. Negative for fatigue.   Eyes:  Negative for blurred vision.   Cardiovascular:  Negative for chest pain.   Endocrine: Positive for polydipsia. Negative for polyphagia and polyuria.   Genitourinary:  Negative for impotence.   Skin:  Negative for pallor.   Neurological:  Negative for dizziness, tremors,  seizures, speech difficulty, weakness and headaches.   Psychiatric/Behavioral:  Negative for confusion. The patient is not nervous/anxious.        Objective   /80   Pulse 71   Temp 36.5 °C (97.7 °F)   Resp 18   Wt 98.2 kg (216 lb 6.4 oz)   SpO2 99%   BMI 33.89 kg/m²     Physical Exam  Constitutional:       General: She is not in acute distress.     Appearance: Normal appearance.   Cardiovascular:      Rate and Rhythm: Normal rate and regular rhythm.      Heart sounds: No murmur heard.  Pulmonary:      Breath sounds: Normal breath sounds. No wheezing.   Neurological:      Mental Status: She is alert.         Assessment/Plan   Problem List Items Addressed This Visit             ICD-10-CM    Type 2 diabetes mellitus without complication, without long-term current use of insulin E11.9    Relevant Medications    tirzepatide (Mounjaro) 15 mg/0.5 mL pen injector    Other Relevant Orders    POCT glycosylated hemoglobin (Hb A1C) manually resulted (Completed)

## 2025-05-02 ENCOUNTER — PHARMACY VISIT (OUTPATIENT)
Dept: PHARMACY | Facility: CLINIC | Age: 40
End: 2025-05-02
Payer: COMMERCIAL

## 2025-05-02 PROCEDURE — RXOTC WILLOW AMBULATORY OTC CHARGE

## 2025-05-02 PROCEDURE — RXMED WILLOW AMBULATORY MEDICATION CHARGE

## 2025-05-06 ENCOUNTER — PHARMACY VISIT (OUTPATIENT)
Dept: PHARMACY | Facility: CLINIC | Age: 40
End: 2025-05-06

## 2025-05-06 PROCEDURE — RXOTC WILLOW AMBULATORY OTC CHARGE

## 2025-05-08 ENCOUNTER — APPOINTMENT (OUTPATIENT)
Dept: PRIMARY CARE | Facility: CLINIC | Age: 40
End: 2025-05-08
Payer: COMMERCIAL

## 2025-05-29 PROCEDURE — RXMED WILLOW AMBULATORY MEDICATION CHARGE

## 2025-05-30 ENCOUNTER — PHARMACY VISIT (OUTPATIENT)
Dept: PHARMACY | Facility: CLINIC | Age: 40
End: 2025-05-30
Payer: COMMERCIAL

## 2025-06-23 DIAGNOSIS — E11.9 TYPE 2 DIABETES MELLITUS WITHOUT COMPLICATION, WITHOUT LONG-TERM CURRENT USE OF INSULIN: Primary | ICD-10-CM

## 2025-06-25 NOTE — PATIENT INSTRUCTIONS
Thank you for entrusting your care to the Clinical Pharmacy Team! We look forward to seeing you again soon.  Please call your clinical pharmacist with any questions or concerns.    You are enrolled in the [facility] Employee Value Based Insurance Design (VBID) program. This program allows you to receive for $0 co-pays on diabetes medications/supplies.  To maintain your eligibility for this program, you must:  Maintain  employee insurance coverage  Fill prescriptions at a  pharmacy for pick-up or home delivery  Complete a visit with a clinical pharmacist at least once annually    Azra Ann, PharmD  P: [phone]    To schedule an appointment, please contact:  P: [phone]

## 2025-06-25 NOTE — PROGRESS NOTES
Clinical Pharmacy Appointment  [facility] (VBID)    Patient ID: Hansa Mix is a 40 y.o. female who presents to Clinical Pharmacy Team to complete a comprehensive medication review (CMR) with a pharmacist as part of the Value Based Insurance Design (VBID) diabetes program. Pharmacy team may also provide assistance in diabetes management per discussion with referring provider and/or endocrinology. Referring Provider: Perry Tian DO    DM Provider: PCP, Perry Tian DO  Last visit: 4/15/25, Next visit: 7/22/25    [facility] Employee Diabetes Program Enrollment: Renewal  Patient enrolled in  Employee diabetes program for $0 co-pays on diabetes medications/supplies. Enrollment anticipated to be active in 2-4 weeks and will be valid for one year, provided patient meets requirements; remains on  prescription insurance plan, fills eligible medications at  pharmacy, completes annual visit with clinical pharmacy team.   Requested ID enrollment date: 6/26/25  PharmD Management Level: Level 2   Pharmacy fill location: [facility]    Subjective   HPI  DIABETES MELLITUS TYPE 2:    Diagnosed with diabetes: > 5 years  Disease course: at goal, last A1c = 5.1% on 4/15/25, goal A1c < 7%  Eye exam/Optometry: unknown  Foot exam/Podiatry: unknown    PERTINENT PMH REVIEW:  ZACH, PTSD, hidradenitis suppurativa, HLD, obesity  Negatives: ASCVD, UTIs, retinopathy, MTC, MEN2, pancreatitis, HTN    SOCIAL DRIVERS OF HEALTH  Lifestyle  Occupation: works in urology, sees patients    Barriers to Adherence  Adherence/Organization: no concerns  [facility]  Side effects: reports N/V in first 12-24 hours of injection    HOME MONITORING  Blood Glucose  Monitoring Device: Fingerstick glucometer   Monitoring Frequency:  PRN     Current Readings:  Not currently monitoring BG, BG log not present at today's visit,  "denies hypo/hyperglycemic s/sx    Previous:  N/a    Hypoglycemia  Events? denies  Awareness? unknown      Objective   Allergies[1]  Social History     Social History Narrative    Not on file      Vitals  BP Readings from Last 2 Encounters:   04/15/25 118/80   03/05/25 113/60     Labs  A1C  Lab Results   Component Value Date    HGBA1C 5.1 04/15/2025    HGBA1C 6.6 (A) 07/19/2024    HGBA1C 6.5 (A) 03/16/2022     BMP  Lab Results   Component Value Date    CALCIUM 9.3 02/26/2025     02/26/2025    K 4.3 02/26/2025    CO2 25 02/26/2025     02/26/2025    BUN 7 02/26/2025    CREATININE 0.72 02/26/2025    EGFR >90 02/26/2025     LFTs  Lab Results   Component Value Date    ALT 29 07/22/2024    AST 25 07/22/2024    ALKPHOS 63 07/22/2024    BILITOT 0.5 07/22/2024     FLP  Lab Results   Component Value Date    TRIG 145 07/22/2024    CHOL 194 07/22/2024    LDLCALC 134 (H) 07/22/2024    HDL 31.5 07/22/2024     Urine Microalbumin  No results found for: \"MICROALBCREA\"  Weight Management  Wt Readings from Last 3 Encounters:   04/15/25 98.2 kg (216 lb 6.4 oz)   03/05/25 104 kg (230 lb)   02/07/25 104 kg (230 lb)      There is no height or weight on file to calculate BMI.   Medication Review  Current Outpatient Medications   Medication Instructions    acetaminophen (TYLENOL) 1,000 mg, Every 6 hours PRN    Mounjaro 15 mg, subcutaneous, Weekly    ondansetron (ZOFRAN) 4 mg, oral, Every 8 hours PRN      MEDICATION RECONCILIATION  Added: none  Changed: none  Removed:   Nitroglycerin in white petrolatum ointment - therapy completed  Miralax - therapy completed, using chewable gummies  Tizanidine - therapy completed  Tramadol - therapy completed    Drug Interactions:  None warranting change in therapy at this time    CURRENT DIABETES PHARMACOTHERAPY  Mounjaro 15 mg injected subcutaneously once weekly    Comorbidity/Complications Regimen: needs optimization  Lipids:   Statin? no  LDL: not at goal (< 70 mg/dL)  BP:  BP: at goal, < " 130/80  ACE/ARB? no  Renal:   eGFR = >= 90 mL/min/BSA (normal)  UACR: unable to assess, lab value not found  ASCVD: primary prevention  Aspirin? no  _______________________________________________________________________    DISCUSSION/NOTES    Yearly renewal visit for VBID $0 copay program for DM meds/supplies  T2DM   Excellently controlled on monotherapy Mounjaro 15 mg once weekly.  Reports overall QOL greatly improved. Reports ~75 lbs weight loss from baseline, less joint pain, less urinary incontinence, less thirsty, more energy.   Comorbidity/complication regimen  Lipids uncontrolled -  mg/dL at last measure 7/22/24. As pt now > 39 yo, recommend targeting LDL < 70 mg/dL per ADA SOC guidelines for primary ASCVD prevention.   Discussed w/ patient. Counseled on recommendations and primary prevention of ASCVD. Pt expresses understanding. To discuss at annual visit w/ PCP after labs.    Assessment/Plan   Problem List Items Addressed This Visit       Type 2 diabetes mellitus without complication, without long-term current use of insulin - Primary    Relevant Medications    tirzepatide (Mounjaro) 15 mg/0.5 mL pen injector    Other Relevant Orders    Referral to Clinical Pharmacy     Diabetes Mellitus: Type 2  at goal, last A1c = 5.1% on 4/15/25, goal A1c < 7%  Overall, doing well on current regimen, reports side effects, but reports benefits have been outweighed the N/V she has experienced.  Continue:   Mounjaro 15 mg injected subcutaneously once weekly    Comorbidity/Complication Regimen: needs optimization  Lipids - not at goal at last measure 7/22/24. Due for repeat lab monitoring 7/22/25. Recommend repeat lipid panel and initiation of mod-high intensity statin to target LDL < 70 mg/dL.    Clinical Pharmacist Follow Up: 10-12 months for renewal    Azra Ann, PharmD   Clinical Pharmacist  [phone]    Continue all meds under the continuation of care with the referring provider and clinical pharmacy team.  Verbal consent to manage patient's drug therapy was obtained from the patient. They were informed they may decline to participate or withdraw from participation in pharmacy services at any time.         [1] No Known Allergies

## 2025-06-26 ENCOUNTER — TELEMEDICINE (OUTPATIENT)
Dept: PHARMACY | Facility: HOSPITAL | Age: 40
End: 2025-06-26
Payer: COMMERCIAL

## 2025-06-26 ENCOUNTER — PHARMACY VISIT (OUTPATIENT)
Dept: PHARMACY | Facility: CLINIC | Age: 40
End: 2025-06-26
Payer: COMMERCIAL

## 2025-06-26 DIAGNOSIS — E11.9 TYPE 2 DIABETES MELLITUS WITHOUT COMPLICATION, WITHOUT LONG-TERM CURRENT USE OF INSULIN: Primary | ICD-10-CM

## 2025-06-26 PROCEDURE — RXMED WILLOW AMBULATORY MEDICATION CHARGE

## 2025-06-26 RX ORDER — TIRZEPATIDE 15 MG/.5ML
15 INJECTION, SOLUTION SUBCUTANEOUS WEEKLY
Qty: 6 ML | Refills: 3 | Status: SHIPPED | OUTPATIENT
Start: 2025-06-26

## 2025-06-26 NOTE — Clinical Note
Employee $0 Diabetes Meds/Supplies Program (VBID) visit completed. Concerns: continues to do well on Mounjaro.  Now > 41 yo, recommend targeting lower LDL goal of < 70 mg/dL. Recommend annual lab work, then start moderate-high intensity statin.

## 2025-07-18 ENCOUNTER — PHARMACY VISIT (OUTPATIENT)
Dept: PHARMACY | Facility: CLINIC | Age: 40
End: 2025-07-18
Payer: COMMERCIAL

## 2025-07-18 PROCEDURE — RXMED WILLOW AMBULATORY MEDICATION CHARGE

## 2025-07-22 ENCOUNTER — OFFICE VISIT (OUTPATIENT)
Dept: PRIMARY CARE | Facility: CLINIC | Age: 40
End: 2025-07-22
Payer: COMMERCIAL

## 2025-07-22 VITALS
HEIGHT: 67 IN | BODY MASS INDEX: 31.77 KG/M2 | RESPIRATION RATE: 20 BRPM | DIASTOLIC BLOOD PRESSURE: 68 MMHG | SYSTOLIC BLOOD PRESSURE: 122 MMHG | WEIGHT: 202.4 LBS | HEART RATE: 101 BPM | TEMPERATURE: 98.2 F | OXYGEN SATURATION: 100 %

## 2025-07-22 DIAGNOSIS — F41.9 ANXIETY: ICD-10-CM

## 2025-07-22 DIAGNOSIS — R03.0 ELEVATED BLOOD PRESSURE READING: ICD-10-CM

## 2025-07-22 DIAGNOSIS — Z00.00 ROUTINE MEDICAL EXAM: Primary | ICD-10-CM

## 2025-07-22 DIAGNOSIS — E11.9 TYPE 2 DIABETES MELLITUS WITHOUT COMPLICATION, WITHOUT LONG-TERM CURRENT USE OF INSULIN: ICD-10-CM

## 2025-07-22 DIAGNOSIS — Z12.31 ENCOUNTER FOR SCREENING MAMMOGRAM FOR MALIGNANT NEOPLASM OF BREAST: ICD-10-CM

## 2025-07-22 LAB — POC HEMOGLOBIN A1C: 4.9 % (ref 4.2–6.5)

## 2025-07-22 PROCEDURE — 3078F DIAST BP <80 MM HG: CPT | Performed by: FAMILY MEDICINE

## 2025-07-22 PROCEDURE — 3074F SYST BP LT 130 MM HG: CPT | Performed by: FAMILY MEDICINE

## 2025-07-22 PROCEDURE — 83036 HEMOGLOBIN GLYCOSYLATED A1C: CPT | Performed by: FAMILY MEDICINE

## 2025-07-22 PROCEDURE — 99396 PREV VISIT EST AGE 40-64: CPT | Performed by: FAMILY MEDICINE

## 2025-07-22 PROCEDURE — 3044F HG A1C LEVEL LT 7.0%: CPT | Performed by: FAMILY MEDICINE

## 2025-07-22 PROCEDURE — 3008F BODY MASS INDEX DOCD: CPT | Performed by: FAMILY MEDICINE

## 2025-07-22 ASSESSMENT — ENCOUNTER SYMPTOMS
LOSS OF SENSATION IN FEET: 0
DEPRESSION: 0
OCCASIONAL FEELINGS OF UNSTEADINESS: 0

## 2025-07-22 ASSESSMENT — PAIN SCALES - GENERAL: PAINLEVEL_OUTOF10: 0-NO PAIN

## 2025-07-22 NOTE — PROGRESS NOTES
"Subjective   Patient ID: Hansa Mix is a 40 y.o. female who presents for Diabetes and Annual Exam.    Diabetes         Review of Systems    Objective   /68   Pulse 101   Temp 36.8 °C (98.2 °F)   Resp 20   Ht 1.702 m (5' 7\")   Wt 91.8 kg (202 lb 6.4 oz)   SpO2 100%   BMI 31.70 kg/m²     Physical Exam  Vitals and nursing note reviewed.   Constitutional:       General: She is not in acute distress.  HENT:      Right Ear: Tympanic membrane and ear canal normal.      Left Ear: Tympanic membrane and ear canal normal.      Nose: Nose normal. No rhinorrhea.      Mouth/Throat:      Pharynx: Oropharynx is clear. No oropharyngeal exudate or posterior oropharyngeal erythema.      Comments: Dentition wnl    Eyes:      Extraocular Movements: Extraocular movements intact.      Conjunctiva/sclera: Conjunctivae normal.      Pupils: Pupils are equal, round, and reactive to light.     Neck:      Vascular: No carotid bruit.     Cardiovascular:      Rate and Rhythm: Normal rate and regular rhythm.      Heart sounds: Normal heart sounds. No murmur heard.  Pulmonary:      Breath sounds: Normal breath sounds. No wheezing or rhonchi.   Abdominal:      General: Bowel sounds are normal. There is no distension.      Palpations: Abdomen is soft. There is no mass.      Tenderness: There is no abdominal tenderness. There is no guarding or rebound.      Hernia: No hernia is present.     Musculoskeletal:         General: No swelling or tenderness. Normal range of motion.      Cervical back: Normal range of motion and neck supple.   Lymphadenopathy:      Cervical: No cervical adenopathy.     Skin:     General: Skin is warm.      Findings: No rash.     Neurological:      General: No focal deficit present.      Mental Status: She is alert.         Assessment/Plan   Problem List Items Addressed This Visit           ICD-10-CM    Anxiety F41.9    Relevant Orders    TSH with reflex to Free T4 if abnormal    Type 2 diabetes mellitus " without complication, without long-term current use of insulin E11.9    Relevant Orders    POCT glycosylated hemoglobin (Hb A1C) manually resulted (Completed)     Other Visit Diagnoses         Codes      Routine medical exam    -  Primary Z00.00    Relevant Orders    CBC and Auto Differential    Comprehensive Metabolic Panel    Lipid Panel    TSH with reflex to Free T4 if abnormal      Elevated blood pressure reading     R03.0    Relevant Orders    CBC and Auto Differential    Comprehensive Metabolic Panel    Lipid Panel    TSH with reflex to Free T4 if abnormal      Encounter for screening mammogram for malignant neoplasm of breast     Z12.31    Relevant Orders    BI mammo bilateral screening tomosynthesis

## 2025-07-23 LAB
ALBUMIN SERPL-MCNC: 4.2 G/DL (ref 3.6–5.1)
ALP SERPL-CCNC: 66 U/L (ref 31–125)
ALT SERPL-CCNC: 10 U/L (ref 6–29)
ANION GAP SERPL CALCULATED.4IONS-SCNC: 13 MMOL/L (CALC) (ref 7–17)
AST SERPL-CCNC: 10 U/L (ref 10–30)
BASOPHILS # BLD AUTO: 96 CELLS/UL (ref 0–200)
BASOPHILS NFR BLD AUTO: 1 %
BILIRUB SERPL-MCNC: 0.6 MG/DL (ref 0.2–1.2)
BUN SERPL-MCNC: 8 MG/DL (ref 7–25)
CALCIUM SERPL-MCNC: 9.4 MG/DL (ref 8.6–10.2)
CHLORIDE SERPL-SCNC: 106 MMOL/L (ref 98–110)
CHOLEST SERPL-MCNC: 191 MG/DL
CHOLEST/HDLC SERPL: 5.3 (CALC)
CO2 SERPL-SCNC: 19 MMOL/L (ref 20–32)
CREAT SERPL-MCNC: 0.68 MG/DL (ref 0.5–0.99)
EGFRCR SERPLBLD CKD-EPI 2021: 113 ML/MIN/1.73M2
EOSINOPHIL # BLD AUTO: 288 CELLS/UL (ref 15–500)
EOSINOPHIL NFR BLD AUTO: 3 %
ERYTHROCYTE [DISTWIDTH] IN BLOOD BY AUTOMATED COUNT: 12.8 % (ref 11–15)
GLUCOSE SERPL-MCNC: 95 MG/DL (ref 65–99)
HCT VFR BLD AUTO: 47.7 % (ref 35–45)
HDLC SERPL-MCNC: 36 MG/DL
HGB BLD-MCNC: 15.4 G/DL (ref 11.7–15.5)
LDLC SERPL CALC-MCNC: 132 MG/DL (CALC)
LYMPHOCYTES # BLD AUTO: 2208 CELLS/UL (ref 850–3900)
LYMPHOCYTES NFR BLD AUTO: 23 %
MCH RBC QN AUTO: 30.3 PG (ref 27–33)
MCHC RBC AUTO-ENTMCNC: 32.3 G/DL (ref 32–36)
MCV RBC AUTO: 93.7 FL (ref 80–100)
MONOCYTES # BLD AUTO: 461 CELLS/UL (ref 200–950)
MONOCYTES NFR BLD AUTO: 4.8 %
NEUTROPHILS # BLD AUTO: 6547 CELLS/UL (ref 1500–7800)
NEUTROPHILS NFR BLD AUTO: 68.2 %
NONHDLC SERPL-MCNC: 155 MG/DL (CALC)
PLATELET # BLD AUTO: 357 THOUSAND/UL (ref 140–400)
PMV BLD REES-ECKER: 9.9 FL (ref 7.5–12.5)
POTASSIUM SERPL-SCNC: 4 MMOL/L (ref 3.5–5.3)
PROT SERPL-MCNC: 7.3 G/DL (ref 6.1–8.1)
RBC # BLD AUTO: 5.09 MILLION/UL (ref 3.8–5.1)
SODIUM SERPL-SCNC: 138 MMOL/L (ref 135–146)
TRIGL SERPL-MCNC: 120 MG/DL
TSH SERPL-ACNC: 1.06 MIU/L
WBC # BLD AUTO: 9.6 THOUSAND/UL (ref 3.8–10.8)

## 2025-07-24 ENCOUNTER — HOSPITAL ENCOUNTER (OUTPATIENT)
Dept: RADIOLOGY | Facility: HOSPITAL | Age: 40
Discharge: HOME | End: 2025-07-24
Payer: COMMERCIAL

## 2025-07-24 VITALS — WEIGHT: 202 LBS | HEIGHT: 67 IN | BODY MASS INDEX: 31.71 KG/M2

## 2025-07-24 DIAGNOSIS — Z12.31 ENCOUNTER FOR SCREENING MAMMOGRAM FOR MALIGNANT NEOPLASM OF BREAST: ICD-10-CM

## 2025-07-24 PROCEDURE — 77063 BREAST TOMOSYNTHESIS BI: CPT | Performed by: RADIOLOGY

## 2025-07-24 PROCEDURE — 77067 SCR MAMMO BI INCL CAD: CPT | Performed by: RADIOLOGY

## 2025-07-24 PROCEDURE — 77063 BREAST TOMOSYNTHESIS BI: CPT

## 2025-08-01 ENCOUNTER — HOSPITAL ENCOUNTER (OUTPATIENT)
Dept: RADIOLOGY | Facility: HOSPITAL | Age: 40
Discharge: HOME | End: 2025-08-01
Payer: COMMERCIAL

## 2025-08-01 DIAGNOSIS — R92.8 OTHER ABNORMAL AND INCONCLUSIVE FINDINGS ON DIAGNOSTIC IMAGING OF BREAST: ICD-10-CM

## 2025-08-01 PROCEDURE — 76642 ULTRASOUND BREAST LIMITED: CPT | Mod: LT

## (undated) DEVICE — Device

## (undated) DEVICE — GLOVE, SURGICAL, PROTEXIS PI , 7.0, PF, LF

## (undated) DEVICE — DRAIN, CHANNEL, ROUND, FULL FLUTE 19F

## (undated) DEVICE — SUTURE, VICRYL, 2-0, 27 IN, UR-6, VIOLET

## (undated) DEVICE — NEEDLE, HYPODERMIC, PROEDGE, 22G X 1.5, BLACK

## (undated) DEVICE — TIP, SUCTION, YANKAUER, BULB, ADULT

## (undated) DEVICE — DRESSING, ABDOMINAL, WET PRUF, TENDERSORB, 5 X 9 IN, STERILE

## (undated) DEVICE — DRESSING, GAUZE, SUPER FLUFF, 7.75 X 8.75 IN, STERILE

## (undated) DEVICE — GLOVE, SURGICAL, PROTEXIS PI BLUE W/NEUTHERA, 7.5, PF, LF

## (undated) DEVICE — SUTURE, VICRYL, 3-0, 27 IN, SH

## (undated) DEVICE — SYRINGE, 10 CC, LUER LOCK

## (undated) DEVICE — SLEEVE, VASO PRESS, CALF GARMENT, MEDIUM, GREEN